# Patient Record
Sex: MALE | Race: WHITE | NOT HISPANIC OR LATINO | URBAN - METROPOLITAN AREA
[De-identification: names, ages, dates, MRNs, and addresses within clinical notes are randomized per-mention and may not be internally consistent; named-entity substitution may affect disease eponyms.]

---

## 2024-01-14 ENCOUNTER — HOSPITAL ENCOUNTER (INPATIENT)
Facility: HOSPITAL | Age: 84
LOS: 2 days | Discharge: NURSING, REHAB OR LONG-TERM CARE | DRG: 291 | End: 2024-01-18
Attending: STUDENT IN AN ORGANIZED HEALTH CARE EDUCATION/TRAINING PROGRAM | Admitting: HOSPITALIST
Payer: MEDICARE

## 2024-01-14 DIAGNOSIS — J10.1 INFLUENZA A: Primary | ICD-10-CM

## 2024-01-14 DIAGNOSIS — J18.9 PNEUMONIA OF RIGHT UPPER LOBE DUE TO INFECTIOUS ORGANISM: ICD-10-CM

## 2024-01-14 DIAGNOSIS — R07.9 CHEST PAIN: ICD-10-CM

## 2024-01-14 DIAGNOSIS — R06.02 SHORTNESS OF BREATH: ICD-10-CM

## 2024-01-14 DIAGNOSIS — J90 PLEURAL EFFUSION: ICD-10-CM

## 2024-01-14 DIAGNOSIS — R79.89 TROPONIN LEVEL ELEVATED: ICD-10-CM

## 2024-01-14 DIAGNOSIS — I50.9 ACUTE CONGESTIVE HEART FAILURE, UNSPECIFIED HEART FAILURE TYPE: ICD-10-CM

## 2024-01-14 PROBLEM — Z79.01 ANTICOAGULANT LONG-TERM USE: Status: ACTIVE | Noted: 2024-01-14

## 2024-01-14 PROBLEM — Z79.4 TYPE 2 DIABETES MELLITUS WITH HYPOGLYCEMIA, WITH LONG-TERM CURRENT USE OF INSULIN: Status: ACTIVE | Noted: 2024-01-14

## 2024-01-14 PROBLEM — I50.23 ACUTE ON CHRONIC SYSTOLIC HEART FAILURE: Status: ACTIVE | Noted: 2024-01-14

## 2024-01-14 PROBLEM — E11.649 TYPE 2 DIABETES MELLITUS WITH HYPOGLYCEMIA, WITH LONG-TERM CURRENT USE OF INSULIN: Status: ACTIVE | Noted: 2024-01-14

## 2024-01-14 PROBLEM — I48.91 ATRIAL FIBRILLATION: Status: ACTIVE | Noted: 2024-01-14

## 2024-01-14 PROBLEM — C34.90 NON-SMALL CELL LUNG CANCER: Status: ACTIVE | Noted: 2024-01-14

## 2024-01-14 LAB
ALBUMIN SERPL BCP-MCNC: 2.1 G/DL (ref 3.5–5.2)
ALP SERPL-CCNC: 72 U/L (ref 55–135)
ALT SERPL W/O P-5'-P-CCNC: 14 U/L (ref 10–44)
ANION GAP SERPL CALC-SCNC: 5 MMOL/L (ref 8–16)
AST SERPL-CCNC: 20 U/L (ref 10–40)
BASOPHILS # BLD AUTO: 0.03 K/UL (ref 0–0.2)
BASOPHILS NFR BLD: 0.6 % (ref 0–1.9)
BILIRUB SERPL-MCNC: 0.6 MG/DL (ref 0.1–1)
BNP SERPL-MCNC: 645 PG/ML (ref 0–99)
BUN SERPL-MCNC: 16 MG/DL (ref 8–23)
CALCIUM SERPL-MCNC: 8.5 MG/DL (ref 8.7–10.5)
CHLORIDE SERPL-SCNC: 108 MMOL/L (ref 95–110)
CO2 SERPL-SCNC: 30 MMOL/L (ref 23–29)
CREAT SERPL-MCNC: 1 MG/DL (ref 0.5–1.4)
D DIMER PPP IA.FEU-MCNC: 2.34 MG/L FEU
DIFFERENTIAL METHOD BLD: ABNORMAL
EOSINOPHIL # BLD AUTO: 0 K/UL (ref 0–0.5)
EOSINOPHIL NFR BLD: 0.8 % (ref 0–8)
ERYTHROCYTE [DISTWIDTH] IN BLOOD BY AUTOMATED COUNT: 16 % (ref 11.5–14.5)
EST. GFR  (NO RACE VARIABLE): >60 ML/MIN/1.73 M^2
GLUCOSE SERPL-MCNC: 74 MG/DL (ref 70–110)
HCT VFR BLD AUTO: 28.4 % (ref 40–54)
HGB BLD-MCNC: 8.5 G/DL (ref 14–18)
IMM GRANULOCYTES # BLD AUTO: 0.03 K/UL (ref 0–0.04)
IMM GRANULOCYTES NFR BLD AUTO: 0.6 % (ref 0–0.5)
INFLUENZA A, MOLECULAR: POSITIVE
INFLUENZA B, MOLECULAR: NEGATIVE
INR PPP: 1.7 (ref 0.8–1.2)
LYMPHOCYTES # BLD AUTO: 0.6 K/UL (ref 1–4.8)
LYMPHOCYTES NFR BLD: 11.1 % (ref 18–48)
MCH RBC QN AUTO: 26 PG (ref 27–31)
MCHC RBC AUTO-ENTMCNC: 29.9 G/DL (ref 32–36)
MCV RBC AUTO: 87 FL (ref 82–98)
MONOCYTES # BLD AUTO: 0.6 K/UL (ref 0.3–1)
MONOCYTES NFR BLD: 10.9 % (ref 4–15)
NEUTROPHILS # BLD AUTO: 3.9 K/UL (ref 1.8–7.7)
NEUTROPHILS NFR BLD: 76 % (ref 38–73)
NRBC BLD-RTO: 0 /100 WBC
PLATELET # BLD AUTO: 267 K/UL (ref 150–450)
PMV BLD AUTO: 10.3 FL (ref 9.2–12.9)
POCT GLUCOSE: 114 MG/DL (ref 70–110)
POCT GLUCOSE: 138 MG/DL (ref 70–110)
POCT GLUCOSE: 138 MG/DL (ref 70–110)
POCT GLUCOSE: 154 MG/DL (ref 70–110)
POCT GLUCOSE: 163 MG/DL (ref 70–110)
POCT GLUCOSE: 203 MG/DL (ref 70–110)
POCT GLUCOSE: 28 MG/DL (ref 70–110)
POCT GLUCOSE: 66 MG/DL (ref 70–110)
POTASSIUM SERPL-SCNC: 3.5 MMOL/L (ref 3.5–5.1)
PROT SERPL-MCNC: 6.5 G/DL (ref 6–8.4)
PROTHROMBIN TIME: 17.5 SEC (ref 9–12.5)
RBC # BLD AUTO: 3.27 M/UL (ref 4.6–6.2)
SARS-COV-2 RDRP RESP QL NAA+PROBE: NEGATIVE
SODIUM SERPL-SCNC: 143 MMOL/L (ref 136–145)
SPECIMEN SOURCE: ABNORMAL
TROPONIN I SERPL DL<=0.01 NG/ML-MCNC: 0.01 NG/ML (ref 0–0.03)
TROPONIN I SERPL DL<=0.01 NG/ML-MCNC: 0.03 NG/ML (ref 0–0.03)
WBC # BLD AUTO: 5.06 K/UL (ref 3.9–12.7)

## 2024-01-14 PROCEDURE — 85610 PROTHROMBIN TIME: CPT | Performed by: STUDENT IN AN ORGANIZED HEALTH CARE EDUCATION/TRAINING PROGRAM

## 2024-01-14 PROCEDURE — U0002 COVID-19 LAB TEST NON-CDC: HCPCS | Performed by: STUDENT IN AN ORGANIZED HEALTH CARE EDUCATION/TRAINING PROGRAM

## 2024-01-14 PROCEDURE — 93010 ELECTROCARDIOGRAM REPORT: CPT | Mod: ,,, | Performed by: INTERNAL MEDICINE

## 2024-01-14 PROCEDURE — 25000003 PHARM REV CODE 250: Performed by: INTERNAL MEDICINE

## 2024-01-14 PROCEDURE — 85025 COMPLETE CBC W/AUTO DIFF WBC: CPT | Performed by: STUDENT IN AN ORGANIZED HEALTH CARE EDUCATION/TRAINING PROGRAM

## 2024-01-14 PROCEDURE — 87502 INFLUENZA DNA AMP PROBE: CPT | Performed by: STUDENT IN AN ORGANIZED HEALTH CARE EDUCATION/TRAINING PROGRAM

## 2024-01-14 PROCEDURE — 96375 TX/PRO/DX INJ NEW DRUG ADDON: CPT

## 2024-01-14 PROCEDURE — 87502 INFLUENZA DNA AMP PROBE: CPT

## 2024-01-14 PROCEDURE — G0378 HOSPITAL OBSERVATION PER HR: HCPCS

## 2024-01-14 PROCEDURE — 82962 GLUCOSE BLOOD TEST: CPT

## 2024-01-14 PROCEDURE — 99285 EMERGENCY DEPT VISIT HI MDM: CPT | Mod: 25

## 2024-01-14 PROCEDURE — 63600175 PHARM REV CODE 636 W HCPCS: Performed by: INTERNAL MEDICINE

## 2024-01-14 PROCEDURE — 93005 ELECTROCARDIOGRAM TRACING: CPT

## 2024-01-14 PROCEDURE — 85379 FIBRIN DEGRADATION QUANT: CPT | Performed by: STUDENT IN AN ORGANIZED HEALTH CARE EDUCATION/TRAINING PROGRAM

## 2024-01-14 PROCEDURE — 25500020 PHARM REV CODE 255: Performed by: STUDENT IN AN ORGANIZED HEALTH CARE EDUCATION/TRAINING PROGRAM

## 2024-01-14 PROCEDURE — 84484 ASSAY OF TROPONIN QUANT: CPT | Mod: 91 | Performed by: STUDENT IN AN ORGANIZED HEALTH CARE EDUCATION/TRAINING PROGRAM

## 2024-01-14 PROCEDURE — 5A0945A ASSISTANCE WITH RESPIRATORY VENTILATION, 24-96 CONSECUTIVE HOURS, HIGH NASAL FLOW/VELOCITY: ICD-10-PCS | Performed by: EMERGENCY MEDICINE

## 2024-01-14 PROCEDURE — 83880 ASSAY OF NATRIURETIC PEPTIDE: CPT | Performed by: STUDENT IN AN ORGANIZED HEALTH CARE EDUCATION/TRAINING PROGRAM

## 2024-01-14 PROCEDURE — 80053 COMPREHEN METABOLIC PANEL: CPT | Performed by: STUDENT IN AN ORGANIZED HEALTH CARE EDUCATION/TRAINING PROGRAM

## 2024-01-14 RX ORDER — BUMETANIDE 0.25 MG/ML
1 INJECTION INTRAMUSCULAR; INTRAVENOUS ONCE
Status: COMPLETED | OUTPATIENT
Start: 2024-01-14 | End: 2024-01-14

## 2024-01-14 RX ORDER — WARFARIN 3 MG/1
3 TABLET ORAL DAILY
Status: DISCONTINUED | OUTPATIENT
Start: 2024-01-14 | End: 2024-01-14

## 2024-01-14 RX ORDER — LIDOCAINE 560 MG/1
1 PATCH PERCUTANEOUS; TOPICAL; TRANSDERMAL
COMMUNITY
Start: 2024-01-06

## 2024-01-14 RX ORDER — IBUPROFEN 200 MG
16 TABLET ORAL
Status: DISCONTINUED | OUTPATIENT
Start: 2024-01-14 | End: 2024-01-18 | Stop reason: HOSPADM

## 2024-01-14 RX ORDER — WARFARIN 3 MG/1
3 TABLET ORAL DAILY
COMMUNITY

## 2024-01-14 RX ORDER — INSULIN ASPART 100 [IU]/ML
0-5 INJECTION, SOLUTION INTRAVENOUS; SUBCUTANEOUS
Status: DISCONTINUED | OUTPATIENT
Start: 2024-01-14 | End: 2024-01-16

## 2024-01-14 RX ORDER — GABAPENTIN 300 MG/1
300 CAPSULE ORAL NIGHTLY
COMMUNITY
Start: 2023-12-22

## 2024-01-14 RX ORDER — ONDANSETRON 4 MG/1
4 TABLET, ORALLY DISINTEGRATING ORAL EVERY 8 HOURS PRN
COMMUNITY
Start: 2024-01-05

## 2024-01-14 RX ORDER — ASCORBIC ACID 500 MG
500 TABLET ORAL 2 TIMES DAILY
COMMUNITY

## 2024-01-14 RX ORDER — ZINC SULFATE 50(220)MG
1 CAPSULE ORAL EVERY MORNING
COMMUNITY

## 2024-01-14 RX ORDER — SIMETHICONE 80 MG
1 TABLET,CHEWABLE ORAL 4 TIMES DAILY PRN
Status: DISCONTINUED | OUTPATIENT
Start: 2024-01-14 | End: 2024-01-18 | Stop reason: HOSPADM

## 2024-01-14 RX ORDER — GLUCAGON 1 MG
1 KIT INJECTION
Status: DISCONTINUED | OUTPATIENT
Start: 2024-01-14 | End: 2024-01-18 | Stop reason: HOSPADM

## 2024-01-14 RX ORDER — ALUMINUM HYDROXIDE, MAGNESIUM HYDROXIDE, AND SIMETHICONE 1200; 120; 1200 MG/30ML; MG/30ML; MG/30ML
30 SUSPENSION ORAL 4 TIMES DAILY PRN
Status: DISCONTINUED | OUTPATIENT
Start: 2024-01-14 | End: 2024-01-18 | Stop reason: HOSPADM

## 2024-01-14 RX ORDER — DEXTROSE MONOHYDRATE 100 MG/ML
INJECTION, SOLUTION INTRAVENOUS CONTINUOUS
Status: DISCONTINUED | OUTPATIENT
Start: 2024-01-14 | End: 2024-01-14

## 2024-01-14 RX ORDER — LEVOTHYROXINE SODIUM 150 UG/1
150 TABLET ORAL
COMMUNITY
Start: 2023-09-13 | End: 2024-03-11

## 2024-01-14 RX ORDER — LEVOTHYROXINE SODIUM 150 UG/1
150 TABLET ORAL
Status: DISCONTINUED | OUTPATIENT
Start: 2024-01-15 | End: 2024-01-18 | Stop reason: HOSPADM

## 2024-01-14 RX ORDER — METFORMIN HYDROCHLORIDE 500 MG/1
1000 TABLET ORAL 2 TIMES DAILY WITH MEALS
COMMUNITY

## 2024-01-14 RX ORDER — IPRATROPIUM BROMIDE AND ALBUTEROL SULFATE 2.5; .5 MG/3ML; MG/3ML
3 SOLUTION RESPIRATORY (INHALATION) EVERY 4 HOURS PRN
Status: DISCONTINUED | OUTPATIENT
Start: 2024-01-14 | End: 2024-01-18 | Stop reason: HOSPADM

## 2024-01-14 RX ORDER — AMIODARONE HYDROCHLORIDE 200 MG/1
200 TABLET ORAL DAILY
COMMUNITY

## 2024-01-14 RX ORDER — OSELTAMIVIR PHOSPHATE 75 MG/1
75 CAPSULE ORAL 2 TIMES DAILY
Status: DISCONTINUED | OUTPATIENT
Start: 2024-01-14 | End: 2024-01-16

## 2024-01-14 RX ORDER — MONTELUKAST SODIUM 4 MG/1
1 TABLET, CHEWABLE ORAL
COMMUNITY

## 2024-01-14 RX ORDER — ONDANSETRON HYDROCHLORIDE 2 MG/ML
4 INJECTION, SOLUTION INTRAVENOUS EVERY 8 HOURS PRN
Status: DISCONTINUED | OUTPATIENT
Start: 2024-01-14 | End: 2024-01-18 | Stop reason: HOSPADM

## 2024-01-14 RX ORDER — INSULIN LISPRO 100 [IU]/ML
0-15 INJECTION, SOLUTION INTRAVENOUS; SUBCUTANEOUS
COMMUNITY
Start: 2024-01-05 | End: 2024-07-03

## 2024-01-14 RX ORDER — NAPROXEN SODIUM 220 MG/1
81 TABLET, FILM COATED ORAL ONCE
Status: COMPLETED | OUTPATIENT
Start: 2024-01-14 | End: 2024-01-14

## 2024-01-14 RX ORDER — IBUPROFEN 200 MG
24 TABLET ORAL
Status: DISCONTINUED | OUTPATIENT
Start: 2024-01-14 | End: 2024-01-18 | Stop reason: HOSPADM

## 2024-01-14 RX ORDER — BUMETANIDE 0.5 MG/1
0.5 TABLET ORAL
Status: ON HOLD | COMMUNITY
End: 2024-01-17

## 2024-01-14 RX ORDER — NALOXONE HCL 0.4 MG/ML
0.4 VIAL (ML) INJECTION
Status: DISCONTINUED | OUTPATIENT
Start: 2024-01-14 | End: 2024-01-18 | Stop reason: HOSPADM

## 2024-01-14 RX ORDER — SODIUM CHLORIDE 0.9 % (FLUSH) 0.9 %
10 SYRINGE (ML) INJECTION
Status: DISCONTINUED | OUTPATIENT
Start: 2024-01-14 | End: 2024-01-18 | Stop reason: HOSPADM

## 2024-01-14 RX ORDER — ACETAMINOPHEN 325 MG/1
650 TABLET ORAL EVERY 4 HOURS PRN
Status: DISCONTINUED | OUTPATIENT
Start: 2024-01-14 | End: 2024-01-18 | Stop reason: HOSPADM

## 2024-01-14 RX ORDER — GUAIFENESIN 100 MG/5ML
200 SOLUTION ORAL EVERY 4 HOURS PRN
Status: DISCONTINUED | OUTPATIENT
Start: 2024-01-14 | End: 2024-01-18 | Stop reason: HOSPADM

## 2024-01-14 RX ORDER — METFORMIN HYDROCHLORIDE 500 MG/1
1000 TABLET ORAL
COMMUNITY
End: 2024-01-14

## 2024-01-14 RX ORDER — WARFARIN SODIUM 5 MG/1
5 TABLET ORAL DAILY
Status: COMPLETED | OUTPATIENT
Start: 2024-01-14 | End: 2024-01-14

## 2024-01-14 RX ORDER — DOXYCYCLINE HYCLATE 100 MG
100 TABLET ORAL EVERY 12 HOURS
Status: DISCONTINUED | OUTPATIENT
Start: 2024-01-14 | End: 2024-01-18 | Stop reason: HOSPADM

## 2024-01-14 RX ORDER — AMIODARONE HYDROCHLORIDE 200 MG/1
200 TABLET ORAL DAILY
Status: DISCONTINUED | OUTPATIENT
Start: 2024-01-14 | End: 2024-01-18 | Stop reason: HOSPADM

## 2024-01-14 RX ADMIN — AMIODARONE HYDROCHLORIDE 200 MG: 200 TABLET ORAL at 10:01

## 2024-01-14 RX ADMIN — DOXYCYCLINE HYCLATE 100 MG: 100 TABLET, COATED ORAL at 08:01

## 2024-01-14 RX ADMIN — OSELTAMIVIR PHOSPHATE 75 MG: 75 CAPSULE ORAL at 08:01

## 2024-01-14 RX ADMIN — OSELTAMIVIR PHOSPHATE 75 MG: 75 CAPSULE ORAL at 10:01

## 2024-01-14 RX ADMIN — BUMETANIDE 1 MG: 0.25 INJECTION INTRAMUSCULAR; INTRAVENOUS at 07:01

## 2024-01-14 RX ADMIN — IOHEXOL 100 ML: 350 INJECTION, SOLUTION INTRAVENOUS at 06:01

## 2024-01-14 RX ADMIN — WARFARIN SODIUM 5 MG: 5 TABLET ORAL at 06:01

## 2024-01-14 RX ADMIN — DEXTROSE MONOHYDRATE 250 ML: 100 INJECTION, SOLUTION INTRAVENOUS at 10:01

## 2024-01-14 RX ADMIN — ASPIRIN 81 MG CHEWABLE TABLET 81 MG: 81 TABLET CHEWABLE at 10:01

## 2024-01-14 RX ADMIN — GUAIFENESIN 200 MG: 200 SOLUTION ORAL at 03:01

## 2024-01-14 RX ADMIN — DOXYCYCLINE HYCLATE 100 MG: 100 TABLET, COATED ORAL at 10:01

## 2024-01-14 NOTE — SUBJECTIVE & OBJECTIVE
Past Medical History:   Diagnosis Date    Acute kidney failure, unspecified     Chronic systolic heart failure     Current use of long term anticoagulation     Longstanding persistent atrial fibrillation     Metabolic encephalopathy     Mixed hyperlipidemia     Presence of aortocoronary bypass graft     Presence of cardiac pacemaker     Primary hypertension     Stage 3b chronic kidney disease (CKD)     Type 2 diabetes mellitus without complications        Past Surgical History:   Procedure Laterality Date    ATRIAL CARDIAC PACEMAKER INSERTION      CARDIAC SURGERY      COLONOSCOPY      KNEE SURGERY         Review of patient's allergies indicates:   Allergen Reactions    Morphine Shortness Of Breath    Amoxicillin Nausea And Vomiting       No current facility-administered medications on file prior to encounter.     Current Outpatient Medications on File Prior to Encounter   Medication Sig    gabapentin (NEURONTIN) 300 MG capsule Take 300 mg by mouth.    insulin lispro 100 unit/mL pen Inject 0-15 Units into the skin.    levothyroxine (SYNTHROID) 150 MCG tablet Take 150 mcg by mouth.    LIDOcaine 4 % PtMd Apply 1 patch topically.    amiodarone (PACERONE) 200 MG Tab Take 200 mg by mouth.    aspirin 81 mg Cap Take 81 mg by mouth.    bumetanide (BUMEX) 0.5 MG Tab Take 0.5 mg by mouth.    colestipoL (COLESTID) 1 gram Tab Take 1 g by mouth.    insulin glargine,hum.rec.anlog (LANTUS SOLOSTAR U-100 INSULIN SUBQ) Inject 35 Units into the skin every evening.    metFORMIN (GLUCOPHAGE) 500 MG tablet Take 1,000 mg by mouth.    warfarin (COUMADIN) 3 MG tablet Take 3 mg by mouth.     Family History       Problem Relation (Age of Onset)    Cancer Mother, Father          Tobacco Use    Smoking status: Former     Types: Cigarettes    Smokeless tobacco: Never   Substance and Sexual Activity    Alcohol use: Not Currently    Drug use: Not on file    Sexual activity: Not on file     Review of Systems   Respiratory:  Positive for shortness  of breath.    Cardiovascular:  Positive for leg swelling.   All other systems reviewed and are negative.    Objective:     Vital Signs (Most Recent):  Temp: 97.8 °F (36.6 °C) (01/14/24 0707)  Pulse: 70 (01/14/24 0930)  Resp: (!) 21 (01/14/24 0930)  BP: (!) 151/67 (01/14/24 0930)  SpO2: 98 % (01/14/24 0930) Vital Signs (24h Range):  Temp:  [97.8 °F (36.6 °C)-98.5 °F (36.9 °C)] 97.8 °F (36.6 °C)  Pulse:  [68-74] 70  Resp:  [18-21] 21  SpO2:  [97 %-100 %] 98 %  BP: (139-164)/(65-80) 151/67     Weight: 83.5 kg (184 lb 1.4 oz)  Body mass index is 24.97 kg/m².     Physical Exam  HENT:      Head: Normocephalic and atraumatic.   Cardiovascular:      Rate and Rhythm: Normal rate and regular rhythm.      Comments: paced  Pulmonary:      Effort: Pulmonary effort is normal. No respiratory distress.      Comments: Diminished at bases, scattered wheeze  Abdominal:      General: Bowel sounds are normal. There is no distension.      Palpations: Abdomen is soft.      Tenderness: There is no abdominal tenderness.   Musculoskeletal:      Comments: Bilateral pedal edema   Skin:     General: Skin is warm and dry.   Neurological:      Mental Status: He is alert and oriented to person, place, and time. Mental status is at baseline.                Significant Labs: All pertinent labs within the past 24 hours have been reviewed.  CBC:   Recent Labs   Lab 01/14/24 0439   WBC 5.06   HGB 8.5*   HCT 28.4*        CMP:   Recent Labs   Lab 01/14/24 0439      K 3.5      CO2 30*   GLU 74   BUN 16   CREATININE 1.0   CALCIUM 8.5*   PROT 6.5   ALBUMIN 2.1*   BILITOT 0.6   ALKPHOS 72   AST 20   ALT 14   ANIONGAP 5*     Cardiac Markers:   Recent Labs   Lab 01/14/24 0439   *     Coagulation:   Recent Labs   Lab 01/14/24 0439   INR 1.7*     POCT Glucose:   Recent Labs   Lab 01/14/24  1000 01/14/24  1004   POCTGLUCOSE 28* 66*     Troponin:   Recent Labs   Lab 01/14/24  0439 01/14/24  0704   TROPONINI 0.031* 0.008        Significant Imaging: I have reviewed all pertinent imaging results/findings within the past 24 hours.

## 2024-01-14 NOTE — ASSESSMENT & PLAN NOTE
-tamiflu  -doxycycline as recent pneumonia and unable to compare previous images to determine if infiltrates worsening  -supportive care  -supplemental oxygen

## 2024-01-14 NOTE — ASSESSMENT & PLAN NOTE
Patient's FSGs are uncontrolled due to hypoglycemia on current medication regimen.  Last A1c reviewed-   Lab Results   Component Value Date    HGBA1C 6.1 10/13/2022     Most recent fingerstick glucose reviewed-   Recent Labs   Lab 01/14/24  1000 01/14/24  1004   POCTGLUCOSE 28* 66*     Current correctional scale  Low  Decrease anti-hyperglycemic dose as follows-   Antihyperglycemics (From admission, onward)      Start     Stop Route Frequency Ordered    01/14/24 1058  insulin aspart U-100 pen 0-5 Units         -- SubQ Before meals & nightly PRN 01/14/24 0959          Hold Oral hypoglycemics while patient is in the hospital.  Hold basal insulin for now as hypoglycemic  Hypoglycemia protocol

## 2024-01-14 NOTE — ASSESSMENT & PLAN NOTE
This patient has long term use on an anticoagulant with Select Anticoagulant(s): Warfarin/Coumadin. Their long term anticoagulation will be Held or Continued: continued. They are on long term anticoagulation due to Reason for Anticoagulation: Atrial fibrillation.   Pharmacy consulted to manage dosing of coumadin

## 2024-01-14 NOTE — ASSESSMENT & PLAN NOTE
Patient is identified as having Systolic (HFrEF) heart failure that is Acute on chronic. CHF is currently uncontrolled due to Pulmonary edema/pleural effusion on CXR. Latest ECHO performed and demonstrates- Patient Name: BILLY GAYTAN W   Patient ID: [id number]  Account #:   : 1940 (83y 2m)  Gender: M   Study Date: 2023 07:38:50 AM   Ht(Cm): 183  Wt(Kg): 77.57  BSA: 1.99   Accession #: [id number]   Sonographer: [email or web address]  Location: [facility] Provider: NINI ZALDIVAR     Heart Rate: 71   Quality: The study images were of technically adequate quality.   Ref.Provider: NINI ZALDIVAR     -----------------------     CONCLUSIONS:   1. Dilated left ventricle with severe global hypokinesis and severely depressed left   ventricular systolic function with an EF<20%.   2. Dilated right atrium and ventricle; ICD lead noted in RA/RV.   3. Moderate mitral valve regurgitation.   4. Mild tricuspid valve regurgitation.   . Continue Beta Blocker and monitor clinical status closely. Monitor on telemetry. Patient is on CHF pathway.  Monitor strict Is&Os and daily weights.  Place on fluid restriction of 1.5 L. Continue to stress to patient importance of self efficacy and  on diet for CHF. Last BNP reviewed- and noted below   Recent Labs   Lab 24  0439   *   .  -patient 6 kg up  -IV bumex bid for diuresis  -monitor renal function closely will diuresing  -supplemental oxygen

## 2024-01-14 NOTE — ASSESSMENT & PLAN NOTE
Patient with Long standing persistent (>12 months) atrial fibrillation which is controlled currently with Beta Blocker and Amiodarone. Patient is currently paced.[id number] Score: 2. HASBLED Score: . Anticoagulation indicated. Anticoagulation done with warfarin .

## 2024-01-14 NOTE — ED PROVIDER NOTES
SCRIBE #1 NOTE: I, Catherine Huitron, am scribing for, and in the presence of, Vijay Mcintyre MD. I have scribed the HPI, ROS, and PEx.     SCRIBE #2 NOTE: I, Jadiel Keys Jr., am scribing for, and in the presence of,  Jethro Art MD. I have scribed the remaining portions of the note not scribed by Scribe #1.      History     Chief Complaint   Patient presents with    Shortness of Breath     Sent from [facility] for SOB, nurse reports pt became SOB and sats 90% RA. Pt placed on 2L NC. Pt denies any further SOB    Hypoglycemia     Nurse also reported pts BG 58, was given 3 packs of oral glucose and recheck was 54, EMS gave another 3 packs and now BG 63     Review of patient's allergies indicates:   Allergen Reactions    Morphine Shortness Of Breath    Amoxicillin Nausea And Vomiting         History of Present Illness     HPI    1/14/2024, 4:34 AM  History obtained from the patient and daughter      History of Present Illness: Chavez Kohler is a 83 y.o. male patient with a PMHx of type 2 diabetes mellitus, lung cancer previously on palliative chemotherapy (currently on hold), and coronary artery disease with ischemic cardiomyopathy EF 20%, who presents to the Emergency Department for evaluation of SOB and hypoglycemia which onset earlier this morning. Pt is currently living at [facility], and was sent to the ED from there. Pt's nurse at [place] reports he was placed on 2L NC, and now denies any SOB. Pt's nurse also reports his BG was 58. He was given 3 packs of oral glucose. After rechecking, his BG was 54. EMS gave pt another 3 packs of oral glucose en route. Symptoms are constant and moderate in severity. No mitigating or exacerbating factors reported. No associated sxs. Patient denies any CP, HA, fever, and all other sxs at this time. Prior Tx includes 6 packs of oral glucose. No further complaints or concerns at this time.       Arrival mode:  EMS     PCP: No,  Primary Doctor        Past Medical History:  Past Medical History:   Diagnosis Date    Acute kidney failure, unspecified     Chronic systolic heart failure     Current use of long term anticoagulation     Longstanding persistent atrial fibrillation     Metabolic encephalopathy     Mixed hyperlipidemia     Presence of aortocoronary bypass graft     Presence of cardiac pacemaker     Primary hypertension     Stage 3b chronic kidney disease (CKD)     Type 2 diabetes mellitus without complications        Past Surgical History:  History reviewed. No pertinent surgical history.      Family History:  History reviewed. No pertinent family history.    Social History:  Social History     Tobacco Use    Smoking status: Not on file    Smokeless tobacco: Not on file   Substance and Sexual Activity    Alcohol use: Not on file    Drug use: Not on file    Sexual activity: Not on file        Review of Systems     Review of Systems   Constitutional:  Negative for chills and fever.        (+) hypoglycemia   HENT:  Negative for congestion and sore throat.    Respiratory:  Positive for shortness of breath.    Cardiovascular:  Negative for chest pain.   Gastrointestinal:  Negative for abdominal pain, nausea and vomiting.   Genitourinary:  Negative for dysuria.   Musculoskeletal:  Negative for back pain.   Skin:  Negative for rash.   Neurological:  Negative for weakness and headaches.   Hematological:  Does not bruise/bleed easily.   All other systems reviewed and are negative.       Physical Exam     Initial Vitals [01/14/24 0354]   BP Pulse Resp Temp SpO2   (!) 163/77 68 18 98.5 °F (36.9 °C) 97 %      MAP       --          Physical Exam  Nursing Notes and Vital Signs Reviewed.  Constitutional: Patient is in no acute distress. Well-developed and well-nourished.  Head: Atraumatic. Normocephalic.  Eyes: PERRL. EOM intact. Conjunctivae are not pale. No scleral icterus.  ENT: Mucous membranes are moist. Oropharynx is clear and symmetric.     Neck: Supple. Full ROM. No lymphadenopathy.  Cardiovascular: Regular rate. Irregular rhythm. No murmurs, rubs, or gallops. Distal pulses are 2+ and symmetric.  Pulmonary/Chest: No respiratory distress. Faint bilateral basal crackles.  Abdominal: Soft and non-distended.  There is no tenderness.  No rebound, guarding, or rigidity. Good bowel sounds.  Genitourinary: No CVA tenderness  Musculoskeletal: Moves all extremities. No obvious deformities. 1+ edema to BLE. No calf tenderness.  Skin: Warm and dry.  Neurological:  Alert, awake, and appropriate.  Normal speech.  No acute focal neurological deficits are appreciated.  Psychiatric: Normal affect. Good eye contact. Appropriate in content.     ED Course   Procedures  ED Vital Signs:  Vitals:    01/14/24 0354 01/14/24 0437 01/14/24 0440 01/14/24 0457   BP: (!) 163/77      Pulse: 68  68    Resp: 18      Temp: 98.5 °F (36.9 °C)      TempSrc: Oral      SpO2: 97%      Weight:  83.5 kg (184 lb 1.4 oz)     Height:    6' (1.829 m)    01/14/24 0500 01/14/24 0700 01/14/24 0707 01/14/24 0715   BP: (!) 164/80 (!) 142/65     Pulse: 70 69  70   Resp: 18 19  20   Temp:   97.8 °F (36.6 °C)    TempSrc:   Oral    SpO2: 100% 100%  98%   Weight:       Height:        01/14/24 0731   BP: 139/65   Pulse: 69   Resp: 20   Temp:    TempSrc:    SpO2: 97%   Weight:    Height:        Abnormal Lab Results:  Labs Reviewed   INFLUENZA A & B BY MOLECULAR - Abnormal; Notable for the following components:       Result Value    Influenza A, Molecular Positive (*)     All other components within normal limits   CBC W/ AUTO DIFFERENTIAL - Abnormal; Notable for the following components:    RBC 3.27 (*)     Hemoglobin 8.5 (*)     Hematocrit 28.4 (*)     MCH 26.0 (*)     MCHC 29.9 (*)     RDW 16.0 (*)     Immature Granulocytes 0.6 (*)     Lymph # 0.6 (*)     Gran % 76.0 (*)     Lymph % 11.1 (*)     All other components within normal limits   COMPREHENSIVE METABOLIC PANEL - Abnormal; Notable for the  following components:    CO2 30 (*)     Calcium 8.5 (*)     Albumin 2.1 (*)     Anion Gap 5 (*)     All other components within normal limits   TROPONIN I - Abnormal; Notable for the following components:    Troponin I 0.031 (*)     All other components within normal limits   B-TYPE NATRIURETIC PEPTIDE - Abnormal; Notable for the following components:     (*)     All other components within normal limits   D DIMER, QUANTITATIVE - Abnormal; Notable for the following components:    D-Dimer 2.34 (*)     All other components within normal limits   SARS-COV-2 RNA AMPLIFICATION, QUAL   TROPONIN I        All Lab Results:  Results for orders placed or performed during the hospital encounter of 01/14/24   Influenza A & B by Molecular    Specimen: Nasopharyngeal Swab   Result Value Ref Range    Influenza A, Molecular Positive (A) Negative    Influenza B, Molecular Negative Negative    Flu A & B Source Nasal swab    CBC Auto Differential   Result Value Ref Range    WBC 5.06 3.90 - 12.70 K/uL    RBC 3.27 (L) 4.60 - 6.20 M/uL    Hemoglobin 8.5 (L) 14.0 - 18.0 g/dL    Hematocrit 28.4 (L) 40.0 - 54.0 %    MCV 87 82 - 98 fL    MCH 26.0 (L) 27.0 - 31.0 pg    MCHC 29.9 (L) 32.0 - 36.0 g/dL    RDW 16.0 (H) 11.5 - 14.5 %    Platelets 267 150 - 450 K/uL    MPV 10.3 9.2 - 12.9 fL    Immature Granulocytes 0.6 (H) 0.0 - 0.5 %    Gran # (ANC) 3.9 1.8 - 7.7 K/uL    Immature Grans (Abs) 0.03 0.00 - 0.04 K/uL    Lymph # 0.6 (L) 1.0 - 4.8 K/uL    Mono # 0.6 0.3 - 1.0 K/uL    Eos # 0.0 0.0 - 0.5 K/uL    Baso # 0.03 0.00 - 0.20 K/uL    nRBC 0 0 /100 WBC    Gran % 76.0 (H) 38.0 - 73.0 %    Lymph % 11.1 (L) 18.0 - 48.0 %    Mono % 10.9 4.0 - 15.0 %    Eosinophil % 0.8 0.0 - 8.0 %    Basophil % 0.6 0.0 - 1.9 %    Differential Method Automated    Comprehensive Metabolic Panel   Result Value Ref Range    Sodium 143 136 - 145 mmol/L    Potassium 3.5 3.5 - 5.1 mmol/L    Chloride 108 95 - 110 mmol/L    CO2 30 (H) 23 - 29 mmol/L    Glucose 74 70 -  110 mg/dL    BUN 16 8 - 23 mg/dL    Creatinine 1.0 0.5 - 1.4 mg/dL    Calcium 8.5 (L) 8.7 - 10.5 mg/dL    Total Protein 6.5 6.0 - 8.4 g/dL    Albumin 2.1 (L) 3.5 - 5.2 g/dL    Total Bilirubin 0.6 0.1 - 1.0 mg/dL    Alkaline Phosphatase 72 55 - 135 U/L    AST 20 10 - 40 U/L    ALT 14 10 - 44 U/L    eGFR >60 >60 mL/min/1.73 m^2    Anion Gap 5 (L) 8 - 16 mmol/L   COVID-19 Rapid Screening   Result Value Ref Range    SARS-CoV-2 RNA, Amplification, Qual Negative Negative   Troponin I   Result Value Ref Range    Troponin I 0.031 (H) 0.000 - 0.026 ng/mL   Brain Natriuretic Peptide   Result Value Ref Range     (H) 0 - 99 pg/mL   D-Dimer, Quantitative   Result Value Ref Range    D-Dimer 2.34 (H) <0.50 mg/L FEU        Imaging Results:  Imaging Results              CTA Chest Non-Coronary (PE Studies) (Final result)  Result time 01/14/24 06:53:52      Final result by Asif Tyson MD (01/14/24 06:53:52)                   Impression:      1.  No pulmonary embolism.  2.  The findings are most consistent with acute congestive heart failure with severe cardiomegaly, bilateral moderate pleural effusions, and interstitial and alveolar pulmonary edema.    All CT scans at [this location] are performed using dose modulation techniques as appropriate to a performed exam including the following:  Automated exposure control; adjustment of the mA and/or kV according to patient size (this includes techniques or standardized protocols for targeted exams where dose is matched to indication / reason for exam; i.e. extremities or head); use of iterative reconstruction technique.    Finalized on: 1/14/2024 6:53 AM By:  Asif Tyson MD  BRRG# [id number]      2024-01-14 06:56:03.974    [facility]               Narrative:    EXAM: CTA CHEST NON CORONARY (PE STUDIES)    CLINICAL HISTORY: Shortness of breath.  Chest pain.  Positive d-dimer.    TECHNIQUE: The chest was scanned during the intravenous administration of 100 mL of Omnipaque 350 utilizing a  pulmonary angiogram protocol.  Axial and 3-D MIP images are reviewed.    FINDINGS:  There are no filling defects in the pulmonary arteries to indicate a pulmonary embolism.  There is marked cardiomegaly.  There are surgical changes from prior CABG.  There is a left-sided triple lead AICD.  No pericardial effusion.    Moderate bilateral pleural effusions.  No pneumothorax.  Marked interstitial pulmonary edema.  Consolidation in the right hilar region and right upper lobe.  Patchy consolidation in the lung bases.    The thoracic osseous structures are unremarkable.                                         X-Ray Chest 1 View (Final result)  Result time 01/14/24 07:06:55      Final result by Royce Villanueva MD (01/14/24 07:06:55)                   Impression:      See above.      Electronically signed by: Royce Villanueva MD  Date:    01/14/2024  Time:    07:06               Narrative:    EXAMINATION:  XR CHEST 1 VIEW    CLINICAL HISTORY:  Pneumonia., Shortness of breath;    COMPARISON:  None    FINDINGS:  Sternal wires and mediastinal clips noted.  Left AICD is present.    There is consolidation in the right upper lobe consistent with pneumonia.  There are increased interstitial markings in the right lung base    There are bilateral pleural effusions with atelectasis left lower lobe.                                       The EKG was ordered, reviewed, and independently interpreted by the ED provider.  Interpretation time: 04:48  Rate: 68 BPM  Rhythm: sinus rhythm with 1st degree A-V block with occasional premature ventricular complexes  Interpretation: Right bundle branch block. Left posterior fascicular block. Bifascicular block. V2 and V3 0.5mm ST depression without reciprocal change. Inferior infarct, age undetermined. Abnormal ECG. No STEMI.             The Emergency Provider reviewed the vital signs and test results, which are outlined above.     ED Discussion       6:16 AM: Dr. Micntyre transfers care of  patient to Dr. Art.    7:26 AM: Discussed case with Dr Sullivan ([facility]). Dr. Sullivan  agrees with current care and management of pt and accepts admission.   Admitting Service:   Admitting Physician: Dr. Sullivan   Admit to: OBS    7:26 AM: Re-evaluated pt. I have discussed test results, shared treatment plan, and the need for admission with patient and family at bedside. Pt and family express understanding at this time and agree with all information. All questions answered. Pt and family have no further questions or concerns at this time. Pt is ready for admit.     ED Course as of 01/14/24 0732   Sun Jan 14, 2024   0415 DC summary 1/5/24 [facility]OL:  Reason for Hospitalization  DKA    * Diabetic ketoacidosis without coma associated with type 2 diabetes mellitus (HCC)  83-year-old male with past medical history of lung cancer previously on palliative chemotherapy (currently on hold), diabetes, coronary artery disease with ischemic cardiomyopathy EF 20% who presented to the emergency department on 12/31 with complaints of nausea and vomiting. He was discharged 1 day prior from 12/26 to 12/29 for severe sepsis due to pneumonia and testing positive for rhinovirus. Patient was discharged on Augmentin and per his daughter he initially did well however, he developed nausea and vomiting on morning of presentation and became more lethargic. Patient's family ultimately brought him to the ED where he was found to be in DKA. He was initially admitted to ICU for insulin drip. His glucose gradually improved and he was transitioned to subcutaneous insulin and transferred to the medical floor. His nausea and vomiting have resolved. Blood glucoses have stabilized. Patient participated with PT/OT with recommendation for postacute care therapy in a skilled nursing facility. Medical management consulted and ultimately patient will discharge to [facility] to continue postacute care therapy.    Longstanding  persistent atrial fibrillation (HCC)  Rate is currently controlled with amiodarone. On warfarin for anticoagulation. He will need continued monitoring of PT and INR on warfarin therapy.    Chronic systolic heart failure (HCC)  Known EF of less than 20%. Moderate sized pleural effusion seen on CT     Pneumonia of right middle lobe due to infectious organism  Symptomatically improving from a pneumonia standpoint and pneumonia remained stable with CT scan of the chest 1/2. Patient remains on cefepime however will discontinue antibiotics upon discharge. [KB]      ED Course User Index  [KB] Vijay Mcintyre MD     Medical Decision Making  Differential diagnosis:  Pneumonia, CHF, COVID, flu, pulmonary edema    Patient was evaluated history physical examination along with extensive chart review.  Patient complains of shortness a breath in his had recent multiple admissions to our [facility] for pneumonia.  Patient presents today complaining of shortness a breath.  His vital signs are unremarkable however his BNP was elevated at 645 in his troponin was elevated 0.031.  These are both from baseline.  Patient was had elevated D-dimer 2.3 with a hemoglobin of 8.5 and a normal white count.  He is flu A positive and COVID negative.  Chest x-ray shows right upper lobe infiltrate.  Chart review reveals he had a similar type picture on his pneumonia [facility] however we can not compare with the pictures.  CTA was obtained confirming no pulmonary embolus.  He was CHF with severe cardiomegaly and bilateral pulmonary edema.  There is a right upper lobe infiltrate as well.    Amount and/or Complexity of Data Reviewed  Independent Historian: caregiver  External Data Reviewed: labs, radiology and notes.  Labs: ordered. Decision-making details documented in ED Course.  Radiology: ordered and independent interpretation performed. Decision-making details documented in ED Course.  Discussion of management or test interpretation with external  provider(s): Discussed the case with Dr. Sullivan with hospital medicine graciously accepts the patient for admission    Risk  OTC drugs.  Prescription drug management.  Decision regarding hospitalization.                ED Medication(s):  Medications   bumetanide injection 1 mg (has no administration in time range)   iohexoL (OMNIPAQUE 350) injection 100 mL (100 mLs Intravenous Given 1/14/24 0639)       New Prescriptions    No medications on file               Scribe Attestation:   Scribe #1: I performed the above scribed service and the documentation accurately describes the services I performed. I attest to the accuracy of the note.     Attending:   Physician Attestation Statement for Scribe #1: I, Vijay Mcintyre MD, personally performed the services described in this documentation, as scribed by Catherine Huitron, in my presence, and it is both accurate and complete.       Scribe Attestation:   Scribe #2: I performed the above scribed service and the documentation accurately describes the services I performed. I attest to the accuracy of the note.    Attending Attestation:           Physician Attestation for Scribe:    Physician Attestation Statement for Scribe #2: I, Jethro Art MD, reviewed documentation, as scribed by Jadiel Keys Jr. in my presence, and it is both accurate and complete. I also acknowledge and confirm the content of the note done by Scribe #1.           Clinical Impression       ICD-10-CM ICD-9-CM   1. Influenza A  J10.1 487.1   2. Shortness of breath  R06.02 786.05   3. Pneumonia of right upper lobe due to infectious organism  J18.9 486   4. Acute congestive heart failure, unspecified heart failure type  I50.9 428.0   5. Troponin level elevated  R79.89 790.6   6. Pleural effusion  J90 511.9       Disposition:   Disposition: Placed in Observation  Condition: Stable        Jethro Art Jr., MD  01/14/24 0782

## 2024-01-14 NOTE — PHARMACY MED REC
"Admission Medication History     The home medication history was taken by Glenda Zapata.    You may go to "Admission" then "Reconcile Home Medications" tabs to review and/or act upon these items.     The home medication list has been updated by the Pharmacy department.   Please read ALL comments highlighted in yellow.   Please address this information as you see fit.    Feel free to contact us if you have any questions or require assistance.      The medications listed below were removed from the home medication list. Please reorder if appropriate:  Patient reports no longer taking the following medication(s):          Medications listed below were obtained from: Patient/family, [email or web address], and Nursing home,[facility]  (Not in a hospital admission)      LAST MED REC COMPLETED:         Glenda Zapata  [id number]      Current Outpatient Medications on File Prior to Encounter   Medication Sig Dispense Refill Last Dose    amiodarone (PACERONE) 200 MG Tab Take 200 mg by mouth once daily.   1/14/2024    ascorbic acid, vitamin C, (VITAMIN C) 500 MG tablet Take 500 mg by mouth 2 (two) times daily.   1/13/2024    aspirin 81 mg Cap Take 81 mg by mouth once daily.   1/14/2024    bumetanide (BUMEX) 0.5 MG Tab Take 0.5 mg by mouth.   1/13/2024    colestipoL (COLESTID) 1 gram Tab Take 1 g by mouth.   1/13/2024    gabapentin (NEURONTIN) 300 MG capsule Take 300 mg by mouth every evening.   1/13/24    insulin glargine,hum.rec.anlog (LANTUS SOLOSTAR U-100 INSULIN SUBQ) Inject 35 Units into the skin every evening.   1/13/2024    insulin lispro 100 unit/mL pen Inject 0-15 Units into the skin.   1/12/2024    levothyroxine (SYNTHROID) 150 MCG tablet Take 150 mcg by mouth before breakfast.   1/13/2024    LIDOcaine 4 % PtMd Apply 1 patch topically.   1/13/2024    metFORMIN (GLUCOPHAGE) 500 MG tablet Take 1,000 mg by mouth 2 (two) times daily with meals.   1/13/2024    ondansetron (ZOFRAN-ODT) " 4 MG TbDL Take 4 mg by mouth every 8 (eight) hours as needed.   Past Week    warfarin (COUMADIN) 3 MG tablet Take 3 mg by mouth Daily.       zinc sulfate (ZINCATE) 50 mg zinc (220 mg) capsule Take 1 capsule by mouth every morning.   Unknown                         .

## 2024-01-14 NOTE — H&P
[facility] - Emergency Dept.  [facility]  History & Physical    Patient Name: Chavez Kohler  MRN: [id number]  Patient Class: OP- Observation  Admission Date: 1/14/2024  Attending Physician: Ismael Sullivan MD   Primary Care Provider: Tess Primary Doctor         Patient information was obtained from patient, relative(s), past medical records, and ER records.     Subjective:     Principal Problem:Acute on chronic systolic heart failure    Chief Complaint:   Chief Complaint   Patient presents with    Shortness of Breath     Sent from [facility] for SOB, nurse reports pt became SOB and sats 90% RA. Pt placed on 2L NC. Pt denies any further SOB    Hypoglycemia     Nurse also reported pts BG 58, was given 3 packs of oral glucose and recheck was 54, EMS gave another 3 packs and now BG 63        HPI: The patient is an 82 yo male with past medical history of atrial fibrillation on Coumadin, CHF (EF 20%), diabetes, hypothyroidism, hypertension,lung cancer, CKD and HLD who presented to the ED from [facility] with shortness of breath and hypoglycemia. The patient recently treated for pneumonia and DKA at [facility]. He has gained weight since being at [facility] but family thought related to increase in oral intake. Patient had increasing pedal edema but just revealed to family while in the ED. He visited his wife yesterday at [facility]. He reported shortness of breath and was placed on 2L NC with improvement in his symptoms. He was also noted to be hypoglycemic. His daughter reports hyperglycemia yesterday. Patient responds to questions but goes back to sleep. Workup revealed bilateral pleural effusion and Flu A positive. Hospital medicine consulted.    Past Medical History:   Diagnosis Date    Acute kidney failure, unspecified     Chronic systolic heart failure     Current use of long term anticoagulation     Longstanding persistent atrial fibrillation     Metabolic encephalopathy     Mixed  hyperlipidemia     Presence of aortocoronary bypass graft     Presence of cardiac pacemaker     Primary hypertension     Stage 3b chronic kidney disease (CKD)     Type 2 diabetes mellitus without complications        Past Surgical History:   Procedure Laterality Date    ATRIAL CARDIAC PACEMAKER INSERTION      CARDIAC SURGERY      COLONOSCOPY      KNEE SURGERY         Review of patient's allergies indicates:   Allergen Reactions    Morphine Shortness Of Breath    Amoxicillin Nausea And Vomiting       No current facility-administered medications on file prior to encounter.     Current Outpatient Medications on File Prior to Encounter   Medication Sig    gabapentin (NEURONTIN) 300 MG capsule Take 300 mg by mouth.    insulin lispro 100 unit/mL pen Inject 0-15 Units into the skin.    levothyroxine (SYNTHROID) 150 MCG tablet Take 150 mcg by mouth.    LIDOcaine 4 % PtMd Apply 1 patch topically.    amiodarone (PACERONE) 200 MG Tab Take 200 mg by mouth.    aspirin 81 mg Cap Take 81 mg by mouth.    bumetanide (BUMEX) 0.5 MG Tab Take 0.5 mg by mouth.    colestipoL (COLESTID) 1 gram Tab Take 1 g by mouth.    insulin glargine,hum.rec.anlog (LANTUS SOLOSTAR U-100 INSULIN SUBQ) Inject 35 Units into the skin every evening.    metFORMIN (GLUCOPHAGE) 500 MG tablet Take 1,000 mg by mouth.    warfarin (COUMADIN) 3 MG tablet Take 3 mg by mouth.     Family History       Problem Relation (Age of Onset)    Cancer Mother, Father          Tobacco Use    Smoking status: Former     Types: Cigarettes    Smokeless tobacco: Never   Substance and Sexual Activity    Alcohol use: Not Currently    Drug use: Not on file    Sexual activity: Not on file     Review of Systems   Respiratory:  Positive for shortness of breath.    Cardiovascular:  Positive for leg swelling.   All other systems reviewed and are negative.    Objective:     Vital Signs (Most Recent):  Temp: 97.8 °F (36.6 °C) (01/14/24 0707)  Pulse: 70 (01/14/24 0930)  Resp: (!) 21 (01/14/24  0930)  BP: (!) 151/67 (01/14/24 0930)  SpO2: 98 % (01/14/24 0930) Vital Signs (24h Range):  Temp:  [97.8 °F (36.6 °C)-98.5 °F (36.9 °C)] 97.8 °F (36.6 °C)  Pulse:  [68-74] 70  Resp:  [18-21] 21  SpO2:  [97 %-100 %] 98 %  BP: (139-164)/(65-80) 151/67     Weight: 83.5 kg (184 lb 1.4 oz)  Body mass index is 24.97 kg/m².     Physical Exam  HENT:      Head: Normocephalic and atraumatic.   Cardiovascular:      Rate and Rhythm: Normal rate and regular rhythm.      Comments: paced  Pulmonary:      Effort: Pulmonary effort is normal. No respiratory distress.      Comments: Diminished at bases, scattered wheeze  Abdominal:      General: Bowel sounds are normal. There is no distension.      Palpations: Abdomen is soft.      Tenderness: There is no abdominal tenderness.   Musculoskeletal:      Comments: Bilateral pedal edema   Skin:     General: Skin is warm and dry.   Neurological:      Mental Status: He is alert and oriented to person, place, and time. Mental status is at baseline.                Significant Labs: All pertinent labs within the past 24 hours have been reviewed.  CBC:   Recent Labs   Lab 01/14/24 0439   WBC 5.06   HGB 8.5*   HCT 28.4*        CMP:   Recent Labs   Lab 01/14/24 0439      K 3.5      CO2 30*   GLU 74   BUN 16   CREATININE 1.0   CALCIUM 8.5*   PROT 6.5   ALBUMIN 2.1*   BILITOT 0.6   ALKPHOS 72   AST 20   ALT 14   ANIONGAP 5*     Cardiac Markers:   Recent Labs   Lab 01/14/24  0439   *     Coagulation:   Recent Labs   Lab 01/14/24 0439   INR 1.7*     POCT Glucose:   Recent Labs   Lab 01/14/24  1000 01/14/24  1004   POCTGLUCOSE 28* 66*     Troponin:   Recent Labs   Lab 01/14/24  0439 01/14/24  0704   TROPONINI 0.031* 0.008       Significant Imaging: I have reviewed all pertinent imaging results/findings within the past 24 hours.  Assessment/Plan:     * Acute on chronic systolic heart failure  Patient is identified as having Systolic (HFrEF) heart failure that is Acute on  chronic. CHF is currently uncontrolled due to Pulmonary edema/pleural effusion on CXR. Latest ECHO performed and demonstrates- Patient Name: BILLY GAYTAN W   Patient ID: [id number]  Account #:   : 1940 (83y 2m)  Gender: M   Study Date: 2023 07:38:50 AM   Ht(Cm): 183  Wt(Kg): 77.57  BSA: 1.99   Accession #: [id number]   Sonographer: leonard  Location: [facility] Provider: NINI ZALDIVAR     Heart Rate: 71   Quality: The study images were of technically adequate quality.   Ref.Provider: NINI ZALDIVAR     -----------------------     CONCLUSIONS:   1. Dilated left ventricle with severe global hypokinesis and severely depressed left   ventricular systolic function with an EF<20%.   2. Dilated right atrium and ventricle; ICD lead noted in RA/RV.   3. Moderate mitral valve regurgitation.   4. Mild tricuspid valve regurgitation.   . Continue Beta Blocker and monitor clinical status closely. Monitor on telemetry. Patient is on CHF pathway.  Monitor strict Is&Os and daily weights.  Place on fluid restriction of 1.5 L. Continue to stress to patient importance of self efficacy and  on diet for CHF. Last BNP reviewed- and noted below   Recent Labs   Lab 24  0439   *   .  -patient 6 kg up  -IV bumex bid for diuresis  -monitor renal function closely will diuresing  -supplemental oxygen          Anticoagulant long-term use  This patient has long term use on an anticoagulant with Select Anticoagulant(s): Warfarin/Coumadin. Their long term anticoagulation will be Held or Continued: continued. They are on long term anticoagulation due to Reason for Anticoagulation: Atrial fibrillation.   Pharmacy consulted to manage dosing of coumadin    Type 2 diabetes mellitus with hypoglycemia, with long-term current use of insulin  Patient's FSGs are uncontrolled due to hypoglycemia on current medication regimen.  Last A1c reviewed-   Lab Results   Component Value Date    HGBA1C 6.1 10/13/2022     Most  recent fingerstick glucose reviewed-   Recent Labs   Lab 01/14/24  1000 01/14/24  1004   POCTGLUCOSE 28* 66*     Current correctional scale  Low  Decrease anti-hyperglycemic dose as follows-   Antihyperglycemics (From admission, onward)      Start     Stop Route Frequency Ordered    01/14/24 1058  insulin aspart U-100 pen 0-5 Units         -- SubQ Before meals & nightly PRN 01/14/24 0959          Hold Oral hypoglycemics while patient is in the hospital.  Hold basal insulin for now as hypoglycemic  Hypoglycemia protocol        Influenza A  -tamiflu  -doxycycline as recent pneumonia and unable to compare previous images to determine if infiltrates worsening  -supportive care  -supplemental oxygen      Non-small cell lung cancer  -followed by Dr. Laboy  -treatment currently being held  -outpatient follow-up with Dr. Laboy      Atrial fibrillation  Patient with Long standing persistent (>12 months) atrial fibrillation which is controlled currently with Beta Blocker and Amiodarone. Patient is currently paced.[id number] Score: 2. HASBLED Score: . Anticoagulation indicated. Anticoagulation done with warfarin .      VTE Risk Mitigation (From admission, onward)           Ordered     warfarin (COUMADIN) tablet 3 mg  Daily         01/14/24 0956                       On 01/14/2024, patient should be placed in hospital observation services under my care.             Ismael Sullivan MD  Department of Hospital Medicine  [place] - Emergency Dept.

## 2024-01-14 NOTE — PROGRESS NOTES
Pharmacy Consult Note: Warfarin     Chavez Kohler 's Coumadin will be dosed and monitored by Pharmacy.      Target INR goal is 2-3.    INR   Date Value Ref Range Status   01/14/2024 1.7 (H) 0.8 - 1.2 Final     Comment:     Coumadin Therapy:  2.0 - 3.0 for INR for all indicators except mechanical heart valves  and antiphospholipid syndromes which should use 2.5 - 3.5.         Indication: afib  Home dose: 3mg daily  Patient will be given a booster dose today then resume home regimen tomorrow.  Dose for Today: 5mg    Drug interactions: amiodarone, ASA, and doxycycline all increase anticoagulant effect    PT/INR will be monitored daily. Dose adjustments will be made accordingly.      Thank you for allowing us to participate in this patient's care.     Diana Franklin, PharmD 1/14/2024 10:30 AM

## 2024-01-15 LAB
ANION GAP SERPL CALC-SCNC: 13 MMOL/L (ref 8–16)
BUN SERPL-MCNC: 12 MG/DL (ref 8–23)
CALCIUM SERPL-MCNC: 8.2 MG/DL (ref 8.7–10.5)
CHLORIDE SERPL-SCNC: 101 MMOL/L (ref 95–110)
CO2 SERPL-SCNC: 30 MMOL/L (ref 23–29)
CREAT SERPL-MCNC: 1.1 MG/DL (ref 0.5–1.4)
EST. GFR  (NO RACE VARIABLE): >60 ML/MIN/1.73 M^2
ESTIMATED AVG GLUCOSE: 272 MG/DL (ref 68–131)
GLUCOSE SERPL-MCNC: 128 MG/DL (ref 70–110)
HBA1C MFR BLD: 11.1 % (ref 4–5.6)
INR PPP: 1.8 (ref 0.8–1.2)
MAGNESIUM SERPL-MCNC: 1.4 MG/DL (ref 1.6–2.6)
POCT GLUCOSE: 180 MG/DL (ref 70–110)
POCT GLUCOSE: 270 MG/DL (ref 70–110)
POCT GLUCOSE: 288 MG/DL (ref 70–110)
POTASSIUM SERPL-SCNC: 3.3 MMOL/L (ref 3.5–5.1)
PROTHROMBIN TIME: 18.4 SEC (ref 9–12.5)
SODIUM SERPL-SCNC: 144 MMOL/L (ref 136–145)

## 2024-01-15 PROCEDURE — 63600175 PHARM REV CODE 636 W HCPCS: Performed by: INTERNAL MEDICINE

## 2024-01-15 PROCEDURE — 25000003 PHARM REV CODE 250: Performed by: INTERNAL MEDICINE

## 2024-01-15 PROCEDURE — 63600175 PHARM REV CODE 636 W HCPCS: Performed by: HOSPITALIST

## 2024-01-15 PROCEDURE — 97530 THERAPEUTIC ACTIVITIES: CPT

## 2024-01-15 PROCEDURE — 96365 THER/PROPH/DIAG IV INF INIT: CPT

## 2024-01-15 PROCEDURE — 96366 THER/PROPH/DIAG IV INF ADDON: CPT

## 2024-01-15 PROCEDURE — 36415 COLL VENOUS BLD VENIPUNCTURE: CPT | Mod: XB | Performed by: HOSPITALIST

## 2024-01-15 PROCEDURE — 25000003 PHARM REV CODE 250: Performed by: HOSPITALIST

## 2024-01-15 PROCEDURE — 97166 OT EVAL MOD COMPLEX 45 MIN: CPT

## 2024-01-15 PROCEDURE — 96376 TX/PRO/DX INJ SAME DRUG ADON: CPT

## 2024-01-15 PROCEDURE — 85610 PROTHROMBIN TIME: CPT | Performed by: INTERNAL MEDICINE

## 2024-01-15 PROCEDURE — 36415 COLL VENOUS BLD VENIPUNCTURE: CPT | Performed by: INTERNAL MEDICINE

## 2024-01-15 PROCEDURE — 97162 PT EVAL MOD COMPLEX 30 MIN: CPT

## 2024-01-15 PROCEDURE — 83036 HEMOGLOBIN GLYCOSYLATED A1C: CPT | Performed by: HOSPITALIST

## 2024-01-15 PROCEDURE — G0378 HOSPITAL OBSERVATION PER HR: HCPCS

## 2024-01-15 PROCEDURE — 96372 THER/PROPH/DIAG INJ SC/IM: CPT | Performed by: INTERNAL MEDICINE

## 2024-01-15 PROCEDURE — 83735 ASSAY OF MAGNESIUM: CPT | Performed by: INTERNAL MEDICINE

## 2024-01-15 PROCEDURE — 80048 BASIC METABOLIC PNL TOTAL CA: CPT | Performed by: INTERNAL MEDICINE

## 2024-01-15 RX ORDER — BUMETANIDE 0.25 MG/ML
1 INJECTION INTRAMUSCULAR; INTRAVENOUS 2 TIMES DAILY
Status: DISCONTINUED | OUTPATIENT
Start: 2024-01-15 | End: 2024-01-18 | Stop reason: HOSPADM

## 2024-01-15 RX ORDER — MAGNESIUM SULFATE HEPTAHYDRATE 40 MG/ML
2 INJECTION, SOLUTION INTRAVENOUS ONCE
Status: COMPLETED | OUTPATIENT
Start: 2024-01-15 | End: 2024-01-15

## 2024-01-15 RX ORDER — POTASSIUM CHLORIDE 20 MEQ/1
40 TABLET, EXTENDED RELEASE ORAL 2 TIMES DAILY
Status: COMPLETED | OUTPATIENT
Start: 2024-01-15 | End: 2024-01-15

## 2024-01-15 RX ADMIN — BUMETANIDE 1 MG: 0.25 INJECTION INTRAMUSCULAR; INTRAVENOUS at 10:01

## 2024-01-15 RX ADMIN — POTASSIUM CHLORIDE 40 MEQ: 1500 TABLET, EXTENDED RELEASE ORAL at 10:01

## 2024-01-15 RX ADMIN — POTASSIUM CHLORIDE 40 MEQ: 1500 TABLET, EXTENDED RELEASE ORAL at 09:01

## 2024-01-15 RX ADMIN — WARFARIN SODIUM 3 MG: 2 TABLET ORAL at 05:01

## 2024-01-15 RX ADMIN — MAGNESIUM SULFATE HEPTAHYDRATE 2 G: 40 INJECTION, SOLUTION INTRAVENOUS at 10:01

## 2024-01-15 RX ADMIN — OSELTAMIVIR PHOSPHATE 75 MG: 75 CAPSULE ORAL at 09:01

## 2024-01-15 RX ADMIN — AMIODARONE HYDROCHLORIDE 200 MG: 200 TABLET ORAL at 09:01

## 2024-01-15 RX ADMIN — DOXYCYCLINE HYCLATE 100 MG: 100 TABLET, COATED ORAL at 09:01

## 2024-01-15 RX ADMIN — LEVOTHYROXINE SODIUM 150 MCG: 150 TABLET ORAL at 06:01

## 2024-01-15 RX ADMIN — BUMETANIDE 1 MG: 0.25 INJECTION INTRAMUSCULAR; INTRAVENOUS at 09:01

## 2024-01-15 RX ADMIN — INSULIN ASPART 3 UNITS: 100 INJECTION, SOLUTION INTRAVENOUS; SUBCUTANEOUS at 05:01

## 2024-01-15 RX ADMIN — INSULIN ASPART 1 UNITS: 100 INJECTION, SOLUTION INTRAVENOUS; SUBCUTANEOUS at 09:01

## 2024-01-15 NOTE — PLAN OF CARE
OT shen completed. Sit>stand CGA, functional mobility x30ft with RW and CGA, step>pivot to bedside chair with RW and CGA. Recommending moderate intensity intervention at d/c.

## 2024-01-15 NOTE — PROGRESS NOTES
"Clinical Pharmacy Progress Note: Coumadin Dosing and Monitoring     Indication: Afib  Goal INR: 2-3    Lab Results   Component Value Date    INR 1.8 (H) 01/15/2024    INR 1.7 (H) 01/14/2024    INR 1.4 01/05/2024     Patient has not yet been educated by pharmacist this admission. However, not a candidate for counseling because he is a nursing home resident.     Recent dosing history: Warfarin 5 mg booster dose given 1/14 (INR 1.7)  Home dosing regimen: Warfarin 3 mg PO daily, which was increased recently on 1/10 from prior regimen of 2.5 mg daily.   Drug interactions: amiodarone, acetaminophen, levothyroxine and doxycycline each may increase the risk of bleeding while on warfarin    Lab order for daily PT/INR? Yes - modified order by removing stop time.   Coumadin diet ordered? Yes - modified diet order with "coumadin restriction" in the comments.     Bridge? No. Okay to leave off per Dr. Layne.     Plan:   - Give warfarin 3 mg today. INR is sub-therapeutic, but we may not see the effect of yesterday's booster dose until tomorrow or Wednesday.   - Pharmacy will continue to monitor daily PT/INR. Dose adjustments will be made accordingly.      Thank you for allowing us to participate in this patient's care.      Savanna Cerda, PharmD 01/15/2024 2:10 PM    "

## 2024-01-15 NOTE — PLAN OF CARE
A209/A209 MICHEAL Kohler is a 83 y.o.male admitted on 1/14/2024 for Acute on chronic systolic heart failure   Code Status: Full Code MRN: [id number]   Review of patient's allergies indicates:   Allergen Reactions    Morphine Shortness Of Breath    Amoxicillin Nausea And Vomiting     Past Medical History:   Diagnosis Date    Acute kidney failure, unspecified     Chronic systolic heart failure     Current use of long term anticoagulation     Longstanding persistent atrial fibrillation     Metabolic encephalopathy     Mixed hyperlipidemia     Presence of aortocoronary bypass graft     Presence of cardiac pacemaker     Primary hypertension     Stage 3b chronic kidney disease (CKD)     Type 2 diabetes mellitus without complications       PRN meds    acetaminophen, 650 mg, Q4H PRN  albuterol-ipratropium, 3 mL, Q4H PRN  aluminum-magnesium hydroxide-simethicone, 30 mL, QID PRN  dextrose 10%, 12.5 g, PRN  dextrose 10%, 25 g, PRN  glucagon (human recombinant), 1 mg, PRN  glucose, 16 g, PRN  glucose, 24 g, PRN  guaiFENesin 100 mg/5 ml, 200 mg, Q4H PRN  insulin aspart U-100, 0-5 Units, QID (AC + HS) PRN  naloxone, 0.4 mg, PRN  ondansetron, 4 mg, Q8H PRN  simethicone, 1 tablet, QID PRN  sodium chloride 0.9%, 10 mL, PRN      Chart check completed. Will continue plan of care.      Orientation: oriented x 4  [place] Coma Scale Score: 15     Lead Monitored: Lead II Rhythm: normal sinus rhythm       VTE Required Core Measure: Pharmacological prophylaxis initiated/maintained Last Bowel Movement: 01/14/24  Diet Low Sodium, 2gm Fluid - 1500mL     Nishant Score: 18  Fall Risk Score: 13  Accucheck []   Freq?      Lines/Drains/Airways       Peripheral Intravenous Line  Duration                  Peripheral IV - Single Lumen 01/14/24 0440 20 G Anterior;Right Forearm <1 day                       Problem: Adult Inpatient Plan of Care  Goal: Plan of Care Review  Outcome: Ongoing, Progressing  Goal: Patient-Specific Goal  (Individualized)  Outcome: Ongoing, Progressing  Goal: Absence of Hospital-Acquired Illness or Injury  Outcome: Ongoing, Progressing  Goal: Optimal Comfort and Wellbeing  Outcome: Ongoing, Progressing  Goal: Readiness for Transition of Care  Outcome: Ongoing, Progressing     Problem: Diabetes Comorbidity  Goal: Blood Glucose Level Within Targeted Range  Outcome: Ongoing, Progressing     Problem: Fall Injury Risk  Goal: Absence of Fall and Fall-Related Injury  Outcome: Ongoing, Progressing     Problem: Skin Injury Risk Increased  Goal: Skin Health and Integrity  Outcome: Ongoing, Progressing

## 2024-01-15 NOTE — PLAN OF CARE
PT EVAL complete. Required CGA for bed mobility, ambulated 30ft CGA using RW. Recommending moderate intensity therapy upon d/c.

## 2024-01-15 NOTE — ASSESSMENT & PLAN NOTE
Patient's FSGs are uncontrolled due to hypoglycemia on current medication regimen.  Last A1c reviewed-   Lab Results   Component Value Date    HGBA1C 11.1 (H) 01/15/2024     Most recent fingerstick glucose reviewed-   Recent Labs   Lab 01/14/24  1738 01/14/24  2024 01/15/24  1140   POCTGLUCOSE 114* 163* 180*       Current correctional scale  Low  Decrease anti-hyperglycemic dose as follows-   Antihyperglycemics (From admission, onward)      Start     Stop Route Frequency Ordered    01/14/24 1058  insulin aspart U-100 pen 0-5 Units         -- SubQ Before meals & nightly PRN 01/14/24 0959          Hold Oral hypoglycemics while patient is in the hospital.  Hold basal insulin for now as hypoglycemic  Hypoglycemia protocol

## 2024-01-15 NOTE — ASSESSMENT & PLAN NOTE
Patient with Long standing persistent (>12 months) atrial fibrillation which is controlled currently with Beta Blocker and Amiodarone. Patient is currently paced.[id number] Score: 3. HASBLED Score: . Anticoagulation indicated. Anticoagulation done with warfarin .

## 2024-01-15 NOTE — ASSESSMENT & PLAN NOTE
Patient is identified as having Systolic (HFrEF) heart failure that is Acute on chronic. CHF is currently uncontrolled due to Pulmonary edema/pleural effusion on CXR. Latest ECHO performed and demonstrates- Patient Name: BILLY GAYTAN W   Patient ID: [id number]  Account #:   : 1940 (83y 2m)  Gender: M   Study Date: 2023 07:38:50 AM   Ht(Cm): 183  Wt(Kg): 77.57  BSA: 1.99   Accession #: [id number]   Sonographer: leonard  Location: [facility] Provider: NINI ZALDIVAR     Heart Rate: 71   Quality: The study images were of technically adequate quality.   Ref.Provider: NINI ZALDIVAR     -----------------------     CONCLUSIONS:   1. Dilated left ventricle with severe global hypokinesis and severely depressed left   ventricular systolic function with an EF<20%.   2. Dilated right atrium and ventricle; ICD lead noted in RA/RV.   3. Moderate mitral valve regurgitation.   4. Mild tricuspid valve regurgitation.   . Continue Beta Blocker and monitor clinical status closely. Monitor on telemetry. Patient is on CHF pathway.  Monitor strict Is&Os and daily weights.  Place on fluid restriction of 1.5 L. Continue to stress to patient importance of self efficacy and  on diet for CHF. Last BNP reviewed- and noted below   Recent Labs   Lab 24  0439   *     .  -patient 6 kg up  -IV bumex bid for diuresis  -monitor renal function closely will diuresing  -supplemental oxygen

## 2024-01-15 NOTE — SUBJECTIVE & OBJECTIVE
Review of Systems   All other systems reviewed and are negative.    Objective:     Vital Signs (Most Recent):  Temp: 97.6 °F (36.4 °C) (01/15/24 1555)  Pulse: 84 (01/15/24 1555)  Resp: 16 (01/15/24 1555)  BP: (!) 152/72 (01/15/24 1555)  SpO2: 100 % (01/15/24 1555) Vital Signs (24h Range):  Temp:  [97.2 °F (36.2 °C)-98.5 °F (36.9 °C)] 97.6 °F (36.4 °C)  Pulse:  [72-88] 84  Resp:  [16-19] 16  SpO2:  [90 %-100 %] 100 %  BP: (142-164)/(61-96) 152/72     Weight: 82 kg (180 lb 12.4 oz)  Body mass index is 24.52 kg/m².    Intake/Output Summary (Last 24 hours) at 1/15/2024 1623  Last data filed at 1/15/2024 1550  Gross per 24 hour   Intake 48.08 ml   Output 475 ml   Net -426.92 ml         Physical Exam  Constitutional:       General: He is not in acute distress.     Appearance: Normal appearance. He is ill-appearing.      Comments: Wearing 2L NC   Cardiovascular:      Rate and Rhythm: Normal rate and regular rhythm.      Heart sounds: No murmur heard.  Pulmonary:      Effort: Pulmonary effort is normal. No respiratory distress.      Breath sounds: Rales present. No wheezing.   Abdominal:      General: There is no distension.      Palpations: Abdomen is soft.      Tenderness: There is no abdominal tenderness.   Musculoskeletal:      Right lower leg: Edema present.      Left lower leg: Edema present.   Neurological:      Mental Status: He is alert.             Significant Labs: All pertinent labs within the past 24 hours have been reviewed.  Recent Lab Results  (Last 5 results in the past 24 hours)        01/15/24  1140   01/15/24  0441   01/15/24  0428   01/14/24 2024 01/14/24  1738        Anion Gap     13           BUN     12           Calcium     8.2           Chloride     101           CO2     30           Creatinine     1.1           eGFR     >60           Estimated Avg Glucose   272             Glucose     128           Hemoglobin A1C External   11.1  Comment: ADA Screening Guidelines:  5.7-6.4%  Consistent with  prediabetes  >or=6.5%  Consistent with diabetes    High levels of fetal hemoglobin interfere with the HbA1C  assay. Heterozygous hemoglobin variants (HbS, HgC, etc)do  not significantly interfere with this assay.   However, presence of multiple variants may affect accuracy.               INR     1.8  Comment: Coumadin Therapy:  2.0 - 3.0 for INR for all indicators except mechanical heart valves  and antiphospholipid syndromes which should use 2.5 - 3.5.             Magnesium      1.4           POCT Glucose 180       163   114       Potassium     3.3           Protime     18.4           Sodium     144                                  Significant Imaging: I have reviewed all pertinent imaging results/findings within the past 24 hours.    CTA Chest Non-Coronary (PE Studies)   Final Result      1.  No pulmonary embolism.   2.  The findings are most consistent with acute congestive heart failure with severe cardiomegaly, bilateral moderate pleural effusions, and interstitial and alveolar pulmonary edema.      All CT scans at [this location] are performed using dose modulation techniques as appropriate to a performed exam including the following:  Automated exposure control; adjustment of the mA and/or kV according to patient size (this includes techniques or standardized protocols for targeted exams where dose is matched to indication / reason for exam; i.e. extremities or head); use of iterative reconstruction technique.      Finalized on: 1/14/2024 6:53 AM By:  Asif Tyson MD   [facility]# [id number]      2024-01-14 06:56:03.974    [facility]      X-Ray Chest 1 View   Final Result      See above.         Electronically signed by: Royce Villanueva MD   Date:    01/14/2024   Time:    07:06

## 2024-01-15 NOTE — PT/OT/SLP EVAL
"Occupational Therapy Evaluation and Treatment    Name: Chavez Kohler  MRN: [id number]  Admitting Diagnosis: Acute on chronic systolic heart failure  Recent Surgery: * No surgery found *      Recommendations:     Discharge Recommendations: Moderate Intensity Therapy  Level of Assistance Recommended: 24 hours significant assistance  Discharge Equipment Recommendations: to be determined by next level of care  Barriers to discharge: None    Assessment:     Chavez Kohler is a 83 y.o. male with a medical diagnosis of Acute on chronic systolic heart failure. He presents with performance deficits affecting function including weakness, impaired endurance, impaired self care skills, impaired functional mobility, impaired balance, impaired cardiopulmonary response to activity.    Rehab Prognosis: Good and Fair; patient would benefit from acute OT services to address these deficits and reach maximum level of function.    Plan:     Patient to be seen 2 x/week to address the above listed problems via self-care/home management, therapeutic activities, therapeutic exercises  Plan of Care Expires: 01/29/24  Plan of Care Reviewed with: patient, grandchild(larisa)    Subjective     Chief Complaint: Reported "I will try."  Patient Comments/Goals: increase independence and return home  Pain/Comfort:  Pain Rating 1: 0/10    Social History:  Patient to acute from SNF. Information below from prior to initial hospitalization.  Living Environment: Patient lives with their spouse in a single story home with  ramp to enter.  Prior Level of Function: Prior to admission, patient was independent with ADLs and household distance ambulation. Used SPC for community distance ambulation.  Roles and Routines: Patient was driving limited distances prior to admission.  Equipment Used at Home: cane, straight  DME owned (not currently used): none  Assistance Upon Discharge: family    Objective:     Communicated with nurseEnrico, prior to session. Patient " found sitting edge of bed with oxygen, telemetry, peripheral IV, bed alarm upon OT entry to room.    General Precautions: Standard, fall, droplet, respiratory, hard of hearing   Orthopedic Precautions: N/A   Braces: N/A    Respiratory Status: Nasal cannula, flow 2 L/min    Occupational Performance    Gait belt applied - Yes    Bed Mobility:   EOB at entry and OOB at end of session    Functional Mobility/Transfers:  Sit <> Stand Transfer with contact guard assistance with rolling walker  Bed <> Chair Transfer using Step Transfer technique with contact guard assistance with rolling walker  Functional Mobility: Patient completed x30ft functional mobility with RW and CGA to increase dynamic standing balance and activity tolerance needed for ADL completion.  Provided with v/c for technique with transfers to increase safety and independence with completion.    Activities of Daily Living:  Upper Body Dressing: minimum assistance  Lower Body Dressing: maximal assistance    Cognitive/Visual Perceptual:  Cognitive/Psychosocial Skills:    -     Oriented to: Person, Place, Time, Situation  -     Follows Commands/attention: Follows one-step commands    Physical Exam:  Balance:    -     Sitting: supervision  -     Standing: contact guard assistance  Upper Extremity Range of Motion:     -       Right Upper Extremity: WFL  -       Left Upper Extremity: WFL  Upper Extremity Strength:    -       Right Upper Extremity: Deficits: grossly 4/5  -       Left Upper Extremity: Deficits: grossly 4/5   Strength:    -       Right Upper Extremity: Deficits: fair  -       Left Upper Extremity: Deficits: fair    AMPAC 6 Click ADL:  AMPAC Total Score: 16    Treatment & Education:  Therapist provided facilitation and instruction of proper body mechanics, energy conservation, and fall prevention strategies during tasks listed above  Patient educated on role of OT, POC, and goals for therapy  Patient educated on importance of OOB activities with  staff member assistance and sitting OOB majority of the day  Encouraged completion of B UE AROM therex throughout the day to increase functional strength and activity tolerance needed for ADL completion.    Patient left up in chair with all lines intact, call button in reach, chair alarm on, and granddaughter present.    GOALS:   Multidisciplinary Problems       Occupational Therapy Goals          Problem: Occupational Therapy    Goal Priority Disciplines Outcome Interventions   Occupational Therapy Goal     OT, PT/OT     Description: Goals to be met by: 1/29/24     Patient will increase functional independence with ADLs by performing:    Toileting from toilet with Contact Guard Assistance for hygiene and clothing management.   Toilet transfer to toilet with Contact Guard Assistance.  Increased functional strength in B UE grossly by 1/2 MM grade.                         History:     Past Medical History:   Diagnosis Date    Acute kidney failure, unspecified     Chronic systolic heart failure     Current use of long term anticoagulation     Longstanding persistent atrial fibrillation     Metabolic encephalopathy     Mixed hyperlipidemia     Presence of aortocoronary bypass graft     Presence of cardiac pacemaker     Primary hypertension     Stage 3b chronic kidney disease (CKD)     Type 2 diabetes mellitus without complications          Past Surgical History:   Procedure Laterality Date    ATRIAL CARDIAC PACEMAKER INSERTION      CARDIAC SURGERY      COLONOSCOPY      KNEE SURGERY         Time Tracking:     OT Date of Treatment: 01/15/24  OT Start Time: 1050  OT Stop Time: 1115  OT Total Time (min): 25 min    Billable Minutes: Evaluation 15 and Therapeutic Activity 10    Britta Tanner, OT  1/15/2024

## 2024-01-15 NOTE — PROGRESS NOTES
[facility] Medicine  Progress Note    Patient Name: Chavez Kohler  MRN: [id number]  Patient Class: OP- Observation   Admission Date: 1/14/2024  Length of Stay: 0 days  Attending Physician: Eleno Layne MD  Primary Care Provider: Tess, Primary Doctor        Subjective:     Principal Problem:Acute on chronic systolic heart failure        HPI:  The patient is an 82 yo male with past medical history of atrial fibrillation on Coumadin, CHF (EF 20%), diabetes, hypothyroidism, hypertension,lung cancer, CKD and HLD who presented to the ED from [facility] with shortness of breath and hypoglycemia. The patient recently treated for pneumonia and DKA at [facility]. He has gained weight since being at [facility] but family thought related to increase in oral intake. Patient had increasing pedal edema but just revealed to family while in the ED. He visited his wife yesterday at [facility]. He reported shortness of breath and was placed on 2L NC with improvement in his symptoms. He was also noted to be hypoglycemic. His daughter reports hyperglycemia yesterday. Patient responds to questions but goes back to sleep. Workup revealed bilateral pleural effusion and Flu A positive. Hospital medicine consulted.    Overview/Hospital Course:  1/15/24  NAEON, patient sitting in chair  Family at bedside, updated  Patient arrived from [facility]  Currently on 2L NC, doesn't use at home  Influenza A+, on tamilfu and doxycycline  BLE edema and lung sounds with crackles noted  Continue Bumex 1 mg IV BID      Review of Systems   All other systems reviewed and are negative.    Objective:     Vital Signs (Most Recent):  Temp: 97.6 °F (36.4 °C) (01/15/24 1555)  Pulse: 84 (01/15/24 1555)  Resp: 16 (01/15/24 1555)  BP: (!) 152/72 (01/15/24 1555)  SpO2: 100 % (01/15/24 1555) Vital Signs (24h Range):  Temp:  [97.2 °F (36.2 °C)-98.5 °F (36.9 °C)] 97.6 °F (36.4 °C)  Pulse:  [72-88] 84  Resp:  [16-19] 16  SpO2:  [90 %-100  %] 100 %  BP: (142-164)/(61-96) 152/72     Weight: 82 kg (180 lb 12.4 oz)  Body mass index is 24.52 kg/m².    Intake/Output Summary (Last 24 hours) at 1/15/2024 1623  Last data filed at 1/15/2024 1550  Gross per 24 hour   Intake 48.08 ml   Output 475 ml   Net -426.92 ml         Physical Exam  Constitutional:       General: He is not in acute distress.     Appearance: Normal appearance. He is ill-appearing.      Comments: Wearing 2L NC   Cardiovascular:      Rate and Rhythm: Normal rate and regular rhythm.      Heart sounds: No murmur heard.  Pulmonary:      Effort: Pulmonary effort is normal. No respiratory distress.      Breath sounds: Rales present. No wheezing.   Abdominal:      General: There is no distension.      Palpations: Abdomen is soft.      Tenderness: There is no abdominal tenderness.   Musculoskeletal:      Right lower leg: Edema present.      Left lower leg: Edema present.   Neurological:      Mental Status: He is alert.             Significant Labs: All pertinent labs within the past 24 hours have been reviewed.  Recent Lab Results  (Last 5 results in the past 24 hours)        01/15/24  1140   01/15/24  0441   01/15/24  0428   01/14/24  2024   01/14/24  1738        Anion Gap     13           BUN     12           Calcium     8.2           Chloride     101           CO2     30           Creatinine     1.1           eGFR     >60           Estimated Avg Glucose   272             Glucose     128           Hemoglobin A1C External   11.1  Comment: ADA Screening Guidelines:  5.7-6.4%  Consistent with prediabetes  >or=6.5%  Consistent with diabetes    High levels of fetal hemoglobin interfere with the HbA1C  assay. Heterozygous hemoglobin variants (HbS, HgC, etc)do  not significantly interfere with this assay.   However, presence of multiple variants may affect accuracy.               INR     1.8  Comment: Coumadin Therapy:  2.0 - 3.0 for INR for all indicators except mechanical heart valves  and  antiphospholipid syndromes which should use 2.5 - 3.5.             Magnesium      1.4           POCT Glucose 180       163   114       Potassium     3.3           Protime     18.4           Sodium     144                                  Significant Imaging: I have reviewed all pertinent imaging results/findings within the past 24 hours.    CTA Chest Non-Coronary (PE Studies)   Final Result      1.  No pulmonary embolism.   2.  The findings are most consistent with acute congestive heart failure with severe cardiomegaly, bilateral moderate pleural effusions, and interstitial and alveolar pulmonary edema.      All CT scans at [this location] are performed using dose modulation techniques as appropriate to a performed exam including the following:  Automated exposure control; adjustment of the mA and/or kV according to patient size (this includes techniques or standardized protocols for targeted exams where dose is matched to indication / reason for exam; i.e. extremities or head); use of iterative reconstruction technique.      Finalized on: 2024 6:53 AM By:  Asif Tyson MD   [facility]# [id number]      2024 06:56:03.974    [facility]      X-Ray Chest 1 View   Final Result      See above.         Electronically signed by: Royce Villanueva MD   Date:    2024   Time:    07:06            Assessment/Plan:      * Acute on chronic systolic heart failure  Patient is identified as having Systolic (HFrEF) heart failure that is Acute on chronic. CHF is currently uncontrolled due to Pulmonary edema/pleural effusion on CXR. Latest ECHO performed and demonstrates- Patient Name: BILLY GAYTAN W   Patient ID: [id number]  Account #:   : [id number] (83y 2m)  Gender: M   Study Date: 2023 07:38:50 AM   Ht(Cm): 183  Wt(Kg): 77.57  BSA: 1.99   Accession #: [id number]   Sonographer: leonard  Location: [facility] Provider: NINI ZALDIVAR     Heart Rate: 71   Quality: The study images were of technically adequate quality.    Ref.Provider: NINI ZALDIVAR     -----------------------     CONCLUSIONS:   1. Dilated left ventricle with severe global hypokinesis and severely depressed left   ventricular systolic function with an EF<20%.   2. Dilated right atrium and ventricle; ICD lead noted in RA/RV.   3. Moderate mitral valve regurgitation.   4. Mild tricuspid valve regurgitation.   . Continue Beta Blocker and monitor clinical status closely. Monitor on telemetry. Patient is on CHF pathway.  Monitor strict Is&Os and daily weights.  Place on fluid restriction of 1.5 L. Continue to stress to patient importance of self efficacy and  on diet for CHF. Last BNP reviewed- and noted below   Recent Labs   Lab 01/14/24  0439   *     .  -patient 6 kg up  -IV bumex bid for diuresis  -monitor renal function closely will diuresing  -supplemental oxygen    Influenza A  -tamiflu  -doxycycline as recent pneumonia and unable to compare previous images to determine if infiltrates worsening  -supportive care  -supplemental oxygen    Non-small cell lung cancer  -followed by Dr. Laboy  -treatment currently being held  -outpatient follow-up with Dr. Laboy    Atrial fibrillation  Patient with Long standing persistent (>12 months) atrial fibrillation which is controlled currently with Beta Blocker and Amiodarone. Patient is currently paced.[id number] Score: 3. HASBLED Score: . Anticoagulation indicated. Anticoagulation done with warfarin .    Anticoagulant long-term use  This patient has long term use on an anticoagulant with Select Anticoagulant(s): Warfarin/Coumadin. Their long term anticoagulation will be Held or Continued: continued. They are on long term anticoagulation due to Reason for Anticoagulation: Atrial fibrillation.   Pharmacy consulted to manage dosing of coumadin    Type 2 diabetes mellitus with hypoglycemia, with long-term current use of insulin  Patient's FSGs are uncontrolled due to hypoglycemia on current medication  regimen.  Last A1c reviewed-   Lab Results   Component Value Date    HGBA1C 11.1 (H) 01/15/2024     Most recent fingerstick glucose reviewed-   Recent Labs   Lab 01/14/24  1738 01/14/24  2024 01/15/24  1140   POCTGLUCOSE 114* 163* 180*       Current correctional scale  Low  Decrease anti-hyperglycemic dose as follows-   Antihyperglycemics (From admission, onward)      Start     Stop Route Frequency Ordered    01/14/24 1058  insulin aspart U-100 pen 0-5 Units         -- SubQ Before meals & nightly PRN 01/14/24 0959          Hold Oral hypoglycemics while patient is in the hospital.  Hold basal insulin for now as hypoglycemic  Hypoglycemia protocol      VTE Risk Mitigation (From admission, onward)           Ordered     warfarin tablet 3 mg  Daily         01/14/24 1029                    Discharge Planning   KWASI:      Code Status: Full Code   Is the patient medically ready for discharge?:     Reason for patient still in hospital (select all that apply): Patient trending condition, Laboratory test, and Treatment                     Eleno Layne MD  Department of Hospital Medicine   [facility] - Telemetry ([facility])

## 2024-01-15 NOTE — HOSPITAL COURSE
1/15/24  NAEON, patient sitting in chair  Family at bedside, updated  Patient arrived from [facility]  Currently on 2L NC, doesn't use at home  Influenza A+, on tamilfu and doxycycline  BLE edema and lung sounds with crackles noted  Continue Bumex 1 mg IV BID    1/16/24  NAEON, patient weaned to RA this afternoon  BNP elevated 864, continue IV diuretics  TTE from [facility] with EF 20%, consult Cardiology  PT/OT with reccs for moderate intensity therapy  Return to [facility] likely in 1-2 days    1/17/24  NAEON  Appears euvolemic, stable on RA  Stable for discharge to SNF  Medications adjusted this stay, started on ARB, BB, and bumex dose increased  Advised to follow up with established cardiologist, Dr. Diego

## 2024-01-15 NOTE — PT/OT/SLP EVAL
Physical Therapy Evaluation and Treatment    Patient Name: Chavez Kohler   MRN: [id number]  Recent Surgery: * No surgery found *      Recommendations:     Discharge Recommendations: Moderate Intensity Therapy   Discharge Equipment Recommendations: to be determined by next level of care   Barriers to discharge: None    Assessment:     Chavez Kohler is a 83 y.o. male admitted with a medical diagnosis of Acute on chronic systolic heart failure. He presents with the following impairments/functional limitations: weakness, impaired endurance, impaired functional mobility, gait instability, impaired balance, decreased safety awareness, decreased lower extremity function, impaired cardiopulmonary response to activity.    Patient currently demonstrates a need for moderate intensity therapy on a daily basis secondary to an acute decline from prior level of function.    Rehab Prognosis: Good; patient would benefit from acute PT services to address these deficits and reach maximum level of function.    Plan:     During this hospitalization, patient to be seen 3 x/week to address the above listed problems via gait training, therapeutic activities, therapeutic exercises    Plan of Care Expires: 01/29/24    Subjective     Chief Complaint: Pt is motivated to participate  Patient Comments/Goals: none stated  Pain/Comfort:  Pain Rating 1: 0/10    Social History:  Living Environment: Patient lives with their spouse in a single story home with number of outside stair(s): ramp. Reports he has been at a SNF since a recent admission prior to this one.   Prior Level of Function: Prior to admission, patient was independent, driving and retired, and ambulated household and community distances using SPC in community, no AD in house  Equipment Used at Home: cane, straight, built in shower chair  DME owned (not currently used): none  Assistance Upon Discharge: family    Objective:     Communicated with nurse Weston and epic chart review prior  "to session. Patient found sitting edge of bed with peripheral IV, oxygen, telemetry, bed alarm upon PT entry to room.    General Precautions: Standard, fall, droplet, respiratory   Orthopedic Precautions: N/A   Braces: N/A    Respiratory Status: Nasal cannula, flow 2 L/min    Exams:  Cognition: Patient is oriented to Person, Place, Time, Situation  RLE ROM: WFL  RLE Strength:  Grossly 4/5  LLE ROM: WFL  LLE Strength:  Grossly 4/5  Sensation:    -       Intact  Skin Integrity/Edema:     -       Skin integrity: Visible skin intact    Functional Mobility:  Gait belt applied - Yes  Bed Mobility  Seated EOB at start of session and returned to chair  Transfers  Sit to Stand: contact guard assistance with rolling walker  Bed to Chair: contact guard assistance with rolling walker using Step Transfer  Gait  Patient ambulated 30ft with rolling walker and contact guard assistance. Patient demonstrates occasional unsteady gait. No c/o dizziness, mild SOB with exertion, educated about pursed lip breathing technique and cued for use with mobility, no gross LOB, increased cues for safety. All lines remained intact throughout ambulation trail and portable Supplemental O2 2L utilized.  Balance  Sitting: stand by assistance  Standing: contact guard assistance    Therapeutic Activities and Exercises:   Pt educated on role of PT in acute care and POC. Educated on importance of OOB activities, activity pacing, and HEP (marching/hip flex, hip abd, heel slides/LAQ, quad sets, ankle pumps) in order to maintain/regain strength. Encouraged to sit up in chair for all meals. Educated on proper use of RW for safety and to reduce risk of falling. Educated on "call don't fall" policy and increased risk of falling due to weakness, instructed to utilize call bell for assistance with all transfers. Pt agreeable to all requests.    AM-PAC 6 CLICK MOBILITY  Total Score:16    Patient left up in chair with all lines intact, call button in reach, chair " alarm on, and family present.    GOALS:   Multidisciplinary Problems       Physical Therapy Goals          Problem: Physical Therapy    Goal Priority Disciplines Outcome Goal Variances Interventions   Physical Therapy Goal     PT, PT/OT      Description: Goals to be met by 1/29/24.  1. Pt will complete bed mobility SBA.  2. Pt will complete sit to stand SBA.  3. Pt will ambulate 200ft SBA using RW.  4. Pt will increase AMPAC score by 2 points to progress functional mobility.                       History:     Past Medical History:   Diagnosis Date    Acute kidney failure, unspecified     Chronic systolic heart failure     Current use of long term anticoagulation     Longstanding persistent atrial fibrillation     Metabolic encephalopathy     Mixed hyperlipidemia     Presence of aortocoronary bypass graft     Presence of cardiac pacemaker     Primary hypertension     Stage 3b chronic kidney disease (CKD)     Type 2 diabetes mellitus without complications        Past Surgical History:   Procedure Laterality Date    ATRIAL CARDIAC PACEMAKER INSERTION      CARDIAC SURGERY      COLONOSCOPY      KNEE SURGERY         Time Tracking:     PT Received On: 01/15/24  PT Start Time: 1052  PT Stop Time: 1117  PT Total Time (min): 25 min     Billable Minutes: Evaluation 15min and Therapeutic Activity 10min    1/15/2024

## 2024-01-16 PROBLEM — Z95.810 AICD (AUTOMATIC CARDIOVERTER/DEFIBRILLATOR) PRESENT: Status: ACTIVE | Noted: 2024-01-16

## 2024-01-16 LAB
ANION GAP SERPL CALC-SCNC: 13 MMOL/L (ref 8–16)
BNP SERPL-MCNC: 804 PG/ML (ref 0–99)
BUN SERPL-MCNC: 15 MG/DL (ref 8–23)
CALCIUM SERPL-MCNC: 8.3 MG/DL (ref 8.7–10.5)
CHLORIDE SERPL-SCNC: 98 MMOL/L (ref 95–110)
CO2 SERPL-SCNC: 30 MMOL/L (ref 23–29)
CREAT SERPL-MCNC: 1.2 MG/DL (ref 0.5–1.4)
EST. GFR  (NO RACE VARIABLE): >60 ML/MIN/1.73 M^2
GLUCOSE SERPL-MCNC: 233 MG/DL (ref 70–110)
INR PPP: 1.9 (ref 0.8–1.2)
POCT GLUCOSE: 225 MG/DL (ref 70–110)
POCT GLUCOSE: 293 MG/DL (ref 70–110)
POCT GLUCOSE: 352 MG/DL (ref 70–110)
POCT GLUCOSE: 370 MG/DL (ref 70–110)
POTASSIUM SERPL-SCNC: 4.3 MMOL/L (ref 3.5–5.1)
PROTHROMBIN TIME: 18.8 SEC (ref 9–12.5)
SODIUM SERPL-SCNC: 141 MMOL/L (ref 136–145)

## 2024-01-16 PROCEDURE — 25000003 PHARM REV CODE 250: Performed by: HOSPITALIST

## 2024-01-16 PROCEDURE — 97530 THERAPEUTIC ACTIVITIES: CPT | Mod: CQ

## 2024-01-16 PROCEDURE — 27000221 HC OXYGEN, UP TO 24 HOURS

## 2024-01-16 PROCEDURE — 96372 THER/PROPH/DIAG INJ SC/IM: CPT | Performed by: INTERNAL MEDICINE

## 2024-01-16 PROCEDURE — 85610 PROTHROMBIN TIME: CPT | Performed by: HOSPITALIST

## 2024-01-16 PROCEDURE — 21400001 HC TELEMETRY ROOM

## 2024-01-16 PROCEDURE — 63600175 PHARM REV CODE 636 W HCPCS: Performed by: HOSPITALIST

## 2024-01-16 PROCEDURE — 63600175 PHARM REV CODE 636 W HCPCS: Performed by: INTERNAL MEDICINE

## 2024-01-16 PROCEDURE — 27000207 HC ISOLATION

## 2024-01-16 PROCEDURE — 97535 SELF CARE MNGMENT TRAINING: CPT

## 2024-01-16 PROCEDURE — 99223 1ST HOSP IP/OBS HIGH 75: CPT | Mod: ,,, | Performed by: INTERNAL MEDICINE

## 2024-01-16 PROCEDURE — 97116 GAIT TRAINING THERAPY: CPT | Mod: CQ

## 2024-01-16 PROCEDURE — 96376 TX/PRO/DX INJ SAME DRUG ADON: CPT

## 2024-01-16 PROCEDURE — 94761 N-INVAS EAR/PLS OXIMETRY MLT: CPT

## 2024-01-16 PROCEDURE — 80048 BASIC METABOLIC PNL TOTAL CA: CPT | Performed by: INTERNAL MEDICINE

## 2024-01-16 PROCEDURE — 25000003 PHARM REV CODE 250: Performed by: INTERNAL MEDICINE

## 2024-01-16 PROCEDURE — 99900035 HC TECH TIME PER 15 MIN (STAT)

## 2024-01-16 PROCEDURE — 83880 ASSAY OF NATRIURETIC PEPTIDE: CPT | Performed by: HOSPITALIST

## 2024-01-16 PROCEDURE — 97530 THERAPEUTIC ACTIVITIES: CPT

## 2024-01-16 PROCEDURE — 36415 COLL VENOUS BLD VENIPUNCTURE: CPT | Performed by: HOSPITALIST

## 2024-01-16 RX ORDER — INSULIN ASPART 100 [IU]/ML
0-10 INJECTION, SOLUTION INTRAVENOUS; SUBCUTANEOUS
Status: DISCONTINUED | OUTPATIENT
Start: 2024-01-16 | End: 2024-01-18 | Stop reason: HOSPADM

## 2024-01-16 RX ORDER — METOPROLOL SUCCINATE 25 MG/1
25 TABLET, EXTENDED RELEASE ORAL DAILY
Status: DISCONTINUED | OUTPATIENT
Start: 2024-01-17 | End: 2024-01-18 | Stop reason: HOSPADM

## 2024-01-16 RX ORDER — LOSARTAN POTASSIUM 25 MG/1
25 TABLET ORAL DAILY
Status: DISCONTINUED | OUTPATIENT
Start: 2024-01-17 | End: 2024-01-18 | Stop reason: HOSPADM

## 2024-01-16 RX ORDER — GABAPENTIN 300 MG/1
300 CAPSULE ORAL NIGHTLY
Status: DISCONTINUED | OUTPATIENT
Start: 2024-01-16 | End: 2024-01-18 | Stop reason: HOSPADM

## 2024-01-16 RX ORDER — OSELTAMIVIR PHOSPHATE 30 MG/1
30 CAPSULE ORAL 2 TIMES DAILY
Status: DISCONTINUED | OUTPATIENT
Start: 2024-01-16 | End: 2024-01-18 | Stop reason: HOSPADM

## 2024-01-16 RX ADMIN — DOXYCYCLINE HYCLATE 100 MG: 100 TABLET, COATED ORAL at 09:01

## 2024-01-16 RX ADMIN — AMIODARONE HYDROCHLORIDE 200 MG: 200 TABLET ORAL at 09:01

## 2024-01-16 RX ADMIN — OSELTAMIVIR PHOSPHATE 75 MG: 75 CAPSULE ORAL at 09:01

## 2024-01-16 RX ADMIN — OSELTAMIVIR PHOSPHATE 30 MG: 30 CAPSULE ORAL at 09:01

## 2024-01-16 RX ADMIN — INSULIN ASPART 2 UNITS: 100 INJECTION, SOLUTION INTRAVENOUS; SUBCUTANEOUS at 07:01

## 2024-01-16 RX ADMIN — INSULIN ASPART 5 UNITS: 100 INJECTION, SOLUTION INTRAVENOUS; SUBCUTANEOUS at 12:01

## 2024-01-16 RX ADMIN — INSULIN DETEMIR 20 UNITS: 100 INJECTION, SOLUTION SUBCUTANEOUS at 09:01

## 2024-01-16 RX ADMIN — LEVOTHYROXINE SODIUM 150 MCG: 150 TABLET ORAL at 05:01

## 2024-01-16 RX ADMIN — INSULIN ASPART 10 UNITS: 100 INJECTION, SOLUTION INTRAVENOUS; SUBCUTANEOUS at 05:01

## 2024-01-16 RX ADMIN — INSULIN ASPART 5 UNITS: 100 INJECTION, SOLUTION INTRAVENOUS; SUBCUTANEOUS at 09:01

## 2024-01-16 RX ADMIN — WARFARIN SODIUM 3 MG: 2 TABLET ORAL at 05:01

## 2024-01-16 RX ADMIN — GABAPENTIN 300 MG: 300 CAPSULE ORAL at 09:01

## 2024-01-16 RX ADMIN — BUMETANIDE 1 MG: 0.25 INJECTION INTRAMUSCULAR; INTRAVENOUS at 09:01

## 2024-01-16 NOTE — CONSULTS
[facility] - Tele[facility] ([facility])  Cardiology  Consult Note    Patient Name: Chavez Kohler  MRN: [id number]  Admission Date: 1/14/2024  Hospital Length of Stay: 0 days  Code Status: Full Code   Attending Provider: Eleno Layne MD   Consulting Provider: Jonelle Tinajero MD  Primary Care Physician: Tess, Primary Doctor  Principal Problem:Acute on chronic systolic heart failure    Patient information was obtained from patient and ER records.     Inpatient consult to Cardiology  Consult performed by: Atif Tinajero MD  Consult ordered by: Eleno Layne MD        Subjective:     Chief Complaint:  chf      HPI:    hospital medicine h&P  The patient is an 82 yo male with past medical history of atrial fibrillation on Coumadin, CHF (EF 20%), diabetes, hypothyroidism, hypertension,lung cancer, CKD and HLD who presented to the ED from [facility] with shortness of breath and hypoglycemia. The patient recently treated for pneumonia and DKA at [facility]. He has gained weight since being at [place] but family thought related to increase in oral intake. Patient had increasing pedal edema but just revealed to family while in the ED. He visited his wife yesterday at [facility]. He reported shortness of breath and was placed on 2L NC with improvement in his symptoms. He was also noted to be hypoglycemic. His daughter reports hyperglycemia yesterday. Patient responds to questions but goes back to sleep. Workup revealed bilateral pleural effusion and Flu A positive. Hospital medicine consulted.     Cards consult requested due to history of cardiomyopathy s/p aicd who was in chf in the setting of flu infection. The patient was diuresed and has improved clinically. He is not tolerated entresto due to weight loss weakness and loss orf muscle mass by his report he follows at [facility] with DR EDWARD. HE WAS PLACED ON ATIVIRAL THERAPY HE APPEARS COMFORTABLE.has no leg edema. However his bnp is increased. He is on amiodarone and warfarin for  afib. Currently in sinus rhythm with rbbb.    Past Medical History:   Diagnosis Date    Acute kidney failure, unspecified     Chronic systolic heart failure     Current use of long term anticoagulation     Longstanding persistent atrial fibrillation     Metabolic encephalopathy     Mixed hyperlipidemia     Presence of aortocoronary bypass graft     Presence of cardiac pacemaker     Primary hypertension     Stage 3b chronic kidney disease (CKD)     Type 2 diabetes mellitus without complications        Past Surgical History:   Procedure Laterality Date    ATRIAL CARDIAC PACEMAKER INSERTION      CARDIAC SURGERY      COLONOSCOPY      KNEE SURGERY         Review of patient's allergies indicates:   Allergen Reactions    Morphine Shortness Of Breath    Amoxicillin Nausea And Vomiting       No current facility-administered medications on file prior to encounter.     Current Outpatient Medications on File Prior to Encounter   Medication Sig    amiodarone (PACERONE) 200 MG Tab Take 200 mg by mouth once daily.    ascorbic acid, vitamin C, (VITAMIN C) 500 MG tablet Take 500 mg by mouth 2 (two) times daily.    aspirin 81 mg Cap Take 81 mg by mouth once daily.    bumetanide (BUMEX) 0.5 MG Tab Take 0.5 mg by mouth.    colestipoL (COLESTID) 1 gram Tab Take 1 g by mouth.    gabapentin (NEURONTIN) 300 MG capsule Take 300 mg by mouth every evening.    insulin glargine,hum.rec.anlog (LANTUS SOLOSTAR U-100 INSULIN SUBQ) Inject 35 Units into the skin every evening.    insulin lispro 100 unit/mL pen Inject 0-15 Units into the skin.    levothyroxine (SYNTHROID) 150 MCG tablet Take 150 mcg by mouth before breakfast.    LIDOcaine 4 % PtMd Apply 1 patch topically.    metFORMIN (GLUCOPHAGE) 500 MG tablet Take 1,000 mg by mouth 2 (two) times daily with meals.    ondansetron (ZOFRAN-ODT) 4 MG TbDL Take 4 mg by mouth every 8 (eight) hours as needed.    warfarin (COUMADIN) 3 MG tablet Take 3 mg by mouth Daily.    zinc sulfate (ZINCATE) 50 mg zinc  (220 mg) capsule Take 1 capsule by mouth every morning.     Family History       Problem Relation (Age of Onset)    Cancer Mother, Father          Tobacco Use    Smoking status: Former     Types: Cigarettes    Smokeless tobacco: Never   Substance and Sexual Activity    Alcohol use: Not Currently    Drug use: Not on file    Sexual activity: Not on file     Review of Systems   Constitutional: Negative for malaise/fatigue.   Eyes:  Negative for blurred vision.   Cardiovascular:  Negative for chest pain, claudication, cyanosis, dyspnea on exertion, irregular heartbeat, leg swelling, near-syncope, orthopnea, palpitations and paroxysmal nocturnal dyspnea.   Respiratory:  Negative for cough, hemoptysis and shortness of breath.    Hematologic/Lymphatic: Negative for bleeding problem. Does not bruise/bleed easily.   Skin:  Negative for dry skin and itching.   Musculoskeletal:  Negative for falls, muscle weakness and myalgias.   Gastrointestinal:  Negative for abdominal pain, diarrhea, heartburn, hematemesis, hematochezia and melena.   Genitourinary:  Negative for flank pain and hematuria.   Neurological:  Negative for dizziness, focal weakness, headaches, light-headedness, numbness, paresthesias, seizures and weakness.   Psychiatric/Behavioral:  Negative for altered mental status and memory loss. The patient is not nervous/anxious.    Allergic/Immunologic: Negative for hives.     Objective:     Vital Signs (Most Recent):  Temp: 97.9 °F (36.6 °C) (01/16/24 1218)  Pulse: 81 (01/16/24 1218)  Resp: 19 (01/16/24 1218)  BP: (!) 162/74 (01/16/24 1218)  SpO2: (!) 91 % (01/16/24 1218) Vital Signs (24h Range):  Temp:  [97.6 °F (36.4 °C)-98.5 °F (36.9 °C)] 97.9 °F (36.6 °C)  Pulse:  [69-84] 81  Resp:  [14-20] 19  SpO2:  [91 %-100 %] 91 %  BP: (147-180)/(69-83) 162/74     Weight: 80.6 kg (177 lb 11.1 oz)  Body mass index is 24.1 kg/m².    SpO2: (!) 91 %         Intake/Output Summary (Last 24 hours) at 1/16/2024 7678  Last data filed at  1/16/2024 1303  Gross per 24 hour   Intake 48.08 ml   Output 1300 ml   Net -1251.92 ml       Lines/Drains/Airways       Peripheral Intravenous Line  Duration                  Peripheral IV - Single Lumen 01/14/24 0440 20 G Anterior;Right Forearm 2 days                     Physical Exam  Vitals and nursing note reviewed.   Constitutional:       General: He is not in acute distress.     Appearance: He is well-developed. He is not diaphoretic.   HENT:      Head: Normocephalic and atraumatic.   Eyes:      General:         Right eye: No discharge.         Left eye: No discharge.      Pupils: Pupils are equal, round, and reactive to light.   Neck:      Thyroid: No thyromegaly.      Vascular: No JVD.   Cardiovascular:      Rate and Rhythm: Normal rate and regular rhythm.      Pulses: Intact distal pulses.      Heart sounds: Murmur heard.      High-pitched blowing holosystolic murmur is present with a grade of 2/6 at the apex.      No friction rub. No gallop.   Pulmonary:      Effort: Pulmonary effort is normal. No respiratory distress.      Breath sounds: Normal breath sounds. No wheezing or rales.      Comments: Aicd site well healed.  Chest:      Chest wall: No tenderness.   Abdominal:      General: Bowel sounds are normal. There is no distension.      Palpations: Abdomen is soft.      Tenderness: There is no abdominal tenderness.   Musculoskeletal:         General: Normal range of motion.      Cervical back: Neck supple.      Right lower leg: No edema.      Left lower leg: No edema.   Skin:     General: Skin is warm and dry.      Findings: No erythema or rash.   Neurological:      General: No focal deficit present.      Mental Status: He is alert and oriented to person, place, and time.      Cranial Nerves: No cranial nerve deficit.   Psychiatric:         Mood and Affect: Mood normal.         Behavior: Behavior normal.          Significant Labs:   Recent Lab Results  (Last 5 results in the past 24 hours)         01/16/24  1219   01/16/24  0607   01/16/24  0349   01/15/24  1954   01/15/24  1704        Anion Gap     13           BNP     804  Comment: Values of less than 100 pg/ml are consistent with non-CHF populations.           BUN     15           Calcium     8.3           Chloride     98           CO2     30           Creatinine     1.2           eGFR     >60           Glucose     233           INR     1.9  Comment: Coumadin Therapy:  2.0 - 3.0 for INR for all indicators except mechanical heart valves  and antiphospholipid syndromes which should use 2.5 - 3.5.             POCT Glucose 352   225     288   270       Potassium     4.3           Protime     18.8           Sodium     141                                  Significant Imaging: X-Ray: CXR: X-Ray Chest 1 View (CXR):   Results for orders placed or performed during the hospital encounter of 01/14/24   X-Ray Chest 1 View    Narrative    EXAMINATION:  XR CHEST 1 VIEW    CLINICAL HISTORY:  Pneumonia., Shortness of breath;    COMPARISON:  None    FINDINGS:  Sternal wires and mediastinal clips noted.  Left AICD is present.    There is consolidation in the right upper lobe consistent with pneumonia.  There are increased interstitial markings in the right lung base    There are bilateral pleural effusions with atelectasis left lower lobe.      Impression    See above.      Electronically signed by: Royce Villanueva MD  Date:    01/14/2024  Time:    07:06     Assessment and Plan:     * Acute on chronic systolic heart failure  Being diuresed intolerant to entresto will add ar or ace  Will get [facility]    AICD (automatic cardioverter/defibrillator) present  No shocks continue pacer clinic care.    Anticoagulant long-term use  Continue coumadin    Type 2 diabetes mellitus with hypoglycemia, with long-term current use of insulin  Per primary team    Influenza A  Per hospital team.    Atrial fibrillation  On coumadin and  amio in sinus rhythm will b blockers.        VTE  Risk Mitigation (From admission, onward)           Ordered     warfarin tablet 3 mg  Daily         01/14/24 0169                    Thank you for your consult. I will follow-up with patient. Please contact us if you have any additional questions.    Jonelle Tinajero MD  Cardiology   [facility] - Telemetry ([facility])

## 2024-01-16 NOTE — ASSESSMENT & PLAN NOTE
Patient with Long standing persistent (>12 months) atrial fibrillation which is controlled currently with Beta Blocker and Amiodarone. Patient is currently paced.[id number] Score: 3. HASBLED Score: . Anticoagulation indicated. Anticoagulation done with warfarin .

## 2024-01-16 NOTE — SUBJECTIVE & OBJECTIVE
Review of Systems   All other systems reviewed and are negative.    Objective:     Vital Signs (Most Recent):  Temp: 97.9 °F (36.6 °C) (01/16/24 1218)  Pulse: 81 (01/16/24 1218)  Resp: 19 (01/16/24 1218)  BP: (!) 162/74 (01/16/24 1218)  SpO2: (!) 91 % (01/16/24 1218) Vital Signs (24h Range):  Temp:  [97.6 °F (36.4 °C)-98.5 °F (36.9 °C)] 97.9 °F (36.6 °C)  Pulse:  [69-84] 81  Resp:  [14-20] 19  SpO2:  [91 %-100 %] 91 %  BP: (147-180)/(69-83) 162/74     Weight: 80.6 kg (177 lb 11.1 oz)  Body mass index is 24.1 kg/m².    Intake/Output Summary (Last 24 hours) at 1/16/2024 1519  Last data filed at 1/16/2024 1303  Gross per 24 hour   Intake 48.08 ml   Output 1300 ml   Net -1251.92 ml           Physical Exam  Constitutional:       General: He is not in acute distress.     Appearance: Normal appearance. He is ill-appearing.   Cardiovascular:      Rate and Rhythm: Normal rate and regular rhythm.      Heart sounds: Murmur heard.   Pulmonary:      Effort: Pulmonary effort is normal. No respiratory distress.      Breath sounds: Rales present. No wheezing.   Abdominal:      General: There is no distension.      Palpations: Abdomen is soft.      Tenderness: There is no abdominal tenderness.   Neurological:      Mental Status: He is alert.             Significant Labs: All pertinent labs within the past 24 hours have been reviewed.  Recent Lab Results  (Last 5 results in the past 24 hours)        01/16/24  1219   01/16/24  0607   01/16/24  0349   01/15/24  1954   01/15/24  1704        Anion Gap     13           BNP     804  Comment: Values of less than 100 pg/ml are consistent with non-CHF populations.           BUN     15           Calcium     8.3           Chloride     98           CO2     30           Creatinine     1.2           eGFR     >60           Glucose     233           INR     1.9  Comment: Coumadin Therapy:  2.0 - 3.0 for INR for all indicators except mechanical heart valves  and antiphospholipid syndromes which  should use 2.5 - 3.5.             POCT Glucose 352   225     288   270       Potassium     4.3           Protime     18.8           Sodium     141                                  Significant Imaging: I have reviewed all pertinent imaging results/findings within the past 24 hours.    CTA Chest Non-Coronary (PE Studies)   Final Result      1.  No pulmonary embolism.   2.  The findings are most consistent with acute congestive heart failure with severe cardiomegaly, bilateral moderate pleural effusions, and interstitial and alveolar pulmonary edema.      All CT scans at [this location] are performed using dose modulation techniques as appropriate to a performed exam including the following:  Automated exposure control; adjustment of the mA and/or kV according to patient size (this includes techniques or standardized protocols for targeted exams where dose is matched to indication / reason for exam; i.e. extremities or head); use of iterative reconstruction technique.      Finalized on: 1/14/2024 6:53 AM By:  Asif Tyson MD   [facility]# [id number]      2024-01-14 06:56:03.974    [facility]      X-Ray Chest 1 View   Final Result      See above.         Electronically signed by: Royce Villanueva MD   Date:    01/14/2024   Time:    07:06

## 2024-01-16 NOTE — SUBJECTIVE & OBJECTIVE
Past Medical History:   Diagnosis Date    Acute kidney failure, unspecified     Chronic systolic heart failure     Current use of long term anticoagulation     Longstanding persistent atrial fibrillation     Metabolic encephalopathy     Mixed hyperlipidemia     Presence of aortocoronary bypass graft     Presence of cardiac pacemaker     Primary hypertension     Stage 3b chronic kidney disease (CKD)     Type 2 diabetes mellitus without complications        Past Surgical History:   Procedure Laterality Date    ATRIAL CARDIAC PACEMAKER INSERTION      CARDIAC SURGERY      COLONOSCOPY      KNEE SURGERY         Review of patient's allergies indicates:   Allergen Reactions    Morphine Shortness Of Breath    Amoxicillin Nausea And Vomiting       No current facility-administered medications on file prior to encounter.     Current Outpatient Medications on File Prior to Encounter   Medication Sig    amiodarone (PACERONE) 200 MG Tab Take 200 mg by mouth once daily.    ascorbic acid, vitamin C, (VITAMIN C) 500 MG tablet Take 500 mg by mouth 2 (two) times daily.    aspirin 81 mg Cap Take 81 mg by mouth once daily.    bumetanide (BUMEX) 0.5 MG Tab Take 0.5 mg by mouth.    colestipoL (COLESTID) 1 gram Tab Take 1 g by mouth.    gabapentin (NEURONTIN) 300 MG capsule Take 300 mg by mouth every evening.    insulin glargine,hum.rec.anlog (LANTUS SOLOSTAR U-100 INSULIN SUBQ) Inject 35 Units into the skin every evening.    insulin lispro 100 unit/mL pen Inject 0-15 Units into the skin.    levothyroxine (SYNTHROID) 150 MCG tablet Take 150 mcg by mouth before breakfast.    LIDOcaine 4 % PtMd Apply 1 patch topically.    metFORMIN (GLUCOPHAGE) 500 MG tablet Take 1,000 mg by mouth 2 (two) times daily with meals.    ondansetron (ZOFRAN-ODT) 4 MG TbDL Take 4 mg by mouth every 8 (eight) hours as needed.    warfarin (COUMADIN) 3 MG tablet Take 3 mg by mouth Daily.    zinc sulfate (ZINCATE) 50 mg zinc (220 mg) capsule Take 1 capsule by mouth every  morning.     Family History       Problem Relation (Age of Onset)    Cancer Mother, Father          Tobacco Use    Smoking status: Former     Types: Cigarettes    Smokeless tobacco: Never   Substance and Sexual Activity    Alcohol use: Not Currently    Drug use: Not on file    Sexual activity: Not on file     Review of Systems   Constitutional: Negative for malaise/fatigue.   Eyes:  Negative for blurred vision.   Cardiovascular:  Negative for chest pain, claudication, cyanosis, dyspnea on exertion, irregular heartbeat, leg swelling, near-syncope, orthopnea, palpitations and paroxysmal nocturnal dyspnea.   Respiratory:  Negative for cough, hemoptysis and shortness of breath.    Hematologic/Lymphatic: Negative for bleeding problem. Does not bruise/bleed easily.   Skin:  Negative for dry skin and itching.   Musculoskeletal:  Negative for falls, muscle weakness and myalgias.   Gastrointestinal:  Negative for abdominal pain, diarrhea, heartburn, hematemesis, hematochezia and melena.   Genitourinary:  Negative for flank pain and hematuria.   Neurological:  Negative for dizziness, focal weakness, headaches, light-headedness, numbness, paresthesias, seizures and weakness.   Psychiatric/Behavioral:  Negative for altered mental status and memory loss. The patient is not nervous/anxious.    Allergic/Immunologic: Negative for hives.     Objective:     Vital Signs (Most Recent):  Temp: 97.9 °F (36.6 °C) (01/16/24 1218)  Pulse: 81 (01/16/24 1218)  Resp: 19 (01/16/24 1218)  BP: (!) 162/74 (01/16/24 1218)  SpO2: (!) 91 % (01/16/24 1218) Vital Signs (24h Range):  Temp:  [97.6 °F (36.4 °C)-98.5 °F (36.9 °C)] 97.9 °F (36.6 °C)  Pulse:  [69-84] 81  Resp:  [14-20] 19  SpO2:  [91 %-100 %] 91 %  BP: (147-180)/(69-83) 162/74     Weight: 80.6 kg (177 lb 11.1 oz)  Body mass index is 24.1 kg/m².    SpO2: (!) 91 %         Intake/Output Summary (Last 24 hours) at 1/16/2024 1448  Last data filed at 1/16/2024 1303  Gross per 24 hour   Intake  48.08 ml   Output 1300 ml   Net -1251.92 ml       Lines/Drains/Airways       Peripheral Intravenous Line  Duration                  Peripheral IV - Single Lumen 01/14/24 0440 20 G Anterior;Right Forearm 2 days                     Physical Exam  Vitals and nursing note reviewed.   Constitutional:       General: He is not in acute distress.     Appearance: He is well-developed. He is not diaphoretic.   HENT:      Head: Normocephalic and atraumatic.   Eyes:      General:         Right eye: No discharge.         Left eye: No discharge.      Pupils: Pupils are equal, round, and reactive to light.   Neck:      Thyroid: No thyromegaly.      Vascular: No JVD.   Cardiovascular:      Rate and Rhythm: Normal rate and regular rhythm.      Pulses: Intact distal pulses.      Heart sounds: Murmur heard.      High-pitched blowing holosystolic murmur is present with a grade of 2/6 at the apex.      No friction rub. No gallop.   Pulmonary:      Effort: Pulmonary effort is normal. No respiratory distress.      Breath sounds: Normal breath sounds. No wheezing or rales.      Comments: Aicd site well healed.  Chest:      Chest wall: No tenderness.   Abdominal:      General: Bowel sounds are normal. There is no distension.      Palpations: Abdomen is soft.      Tenderness: There is no abdominal tenderness.   Musculoskeletal:         General: Normal range of motion.      Cervical back: Neck supple.      Right lower leg: No edema.      Left lower leg: No edema.   Skin:     General: Skin is warm and dry.      Findings: No erythema or rash.   Neurological:      General: No focal deficit present.      Mental Status: He is alert and oriented to person, place, and time.      Cranial Nerves: No cranial nerve deficit.   Psychiatric:         Mood and Affect: Mood normal.         Behavior: Behavior normal.          Significant Labs:   Recent Lab Results  (Last 5 results in the past 24 hours)        01/16/24  1219   01/16/24  0607   01/16/24  0349    01/15/24  1954   01/15/24  1704        Anion Gap     13           BNP     804  Comment: Values of less than 100 pg/ml are consistent with non-CHF populations.           BUN     15           Calcium     8.3           Chloride     98           CO2     30           Creatinine     1.2           eGFR     >60           Glucose     233           INR     1.9  Comment: Coumadin Therapy:  2.0 - 3.0 for INR for all indicators except mechanical heart valves  and antiphospholipid syndromes which should use 2.5 - 3.5.             POCT Glucose 352   225     288   270       Potassium     4.3           Protime     18.8           Sodium     141                                  Significant Imaging: X-Ray: CXR: X-Ray Chest 1 View (CXR):   Results for orders placed or performed during the hospital encounter of 01/14/24   X-Ray Chest 1 View    Narrative    EXAMINATION:  XR CHEST 1 VIEW    CLINICAL HISTORY:  Pneumonia., Shortness of breath;    COMPARISON:  None    FINDINGS:  Sternal wires and mediastinal clips noted.  Left AICD is present.    There is consolidation in the right upper lobe consistent with pneumonia.  There are increased interstitial markings in the right lung base    There are bilateral pleural effusions with atelectasis left lower lobe.      Impression    See above.      Electronically signed by: Royce Villanueva MD  Date:    01/14/2024  Time:    07:06

## 2024-01-16 NOTE — ASSESSMENT & PLAN NOTE
Patient is identified as having Systolic (HFrEF) heart failure that is Acute on chronic. CHF is currently uncontrolled due to Pulmonary edema/pleural effusion on CXR. Latest ECHO performed and demonstrates- Patient Name: BILLY GAYTAN W   Patient ID: [id number]  Account #:   : 1940 (83y 2m)  Gender: M   Study Date: 2023 07:38:50 AM   Ht(Cm): 183  Wt(Kg): 77.57  BSA: 1.99   Accession #: [id number]   Sonographer: [email or web address]  Location: [facility] Provider: NINI ZALDIVAR     Heart Rate: 71   Quality: The study images were of technically adequate quality.   Ref.Provider: NINI ZALDIVAR     -----------------------     CONCLUSIONS:   1. Dilated left ventricle with severe global hypokinesis and severely depressed left   ventricular systolic function with an EF<20%.   2. Dilated right atrium and ventricle; ICD lead noted in RA/RV.   3. Moderate mitral valve regurgitation.   4. Mild tricuspid valve regurgitation.   . Continue Beta Blocker and monitor clinical status closely. Monitor on telemetry. Patient is on CHF pathway.  Monitor strict Is&Os and daily weights.  Place on fluid restriction of 1.5 L. Continue to stress to patient importance of self efficacy and  on diet for CHF. Last BNP reviewed- and noted below   Recent Labs   Lab 24  0349   *     .  -patient 6 kg up  -IV bumex bid for diuresis  -monitor renal function closely will diuresing  -supplemental oxygen

## 2024-01-16 NOTE — PROGRESS NOTES
Clinical Pharmacy Progress Note: Coumadin Dosing and Monitoring      Indication: Afib  Goal INR: 2-3  INR = 1.9 trended up from 1.8 yesterday  Recent dosing history: Warfarin 5 mg booster dose given 1/14 (INR 1.7)  Home dosing regimen: Warfarin 3 mg PO daily, which was increased recently on 1/10 from prior regimen of 2.5 mg daily.   Drug interactions: amiodarone, acetaminophen, levothyroxine ,tamiflu and doxycycline each may increase the risk of bleeding while on warfarin   Continue 3 mg po daily today   Pharmacy will continue to monitor daily PT/INR. Dose adjustments will be made accordingly.       Thank you for allowing us to participate in this patient's care.       Becki Boyd [facility] 1/16/2024 12:27 PM

## 2024-01-16 NOTE — PROGRESS NOTES
Pharmacist Renal Dose Adjustment Note    Chavez Kohler is a 83 y.o. male being treated with the medication oseltamivir    Patient Data:    Vital Signs (Most Recent):  Temp: 97.9 °F (36.6 °C) (01/16/24 1218)  Pulse: 81 (01/16/24 1218)  Resp: 19 (01/16/24 1218)  BP: (!) 162/74 (01/16/24 1218)  SpO2: (!) 91 % (01/16/24 1218) Vital Signs (72h Range):  Temp:  [97.2 °F (36.2 °C)-98.5 °F (36.9 °C)]   Pulse:  [68-88]   Resp:  [14-22]   BP: (139-180)/(61-96)   SpO2:  [90 %-100 %]      Recent Labs   Lab 01/14/24  0439 01/15/24  0428 01/16/24  0349   CREATININE 1.0 1.1 1.2     Serum creatinine: 1.2 mg/dL 01/16/24 0349  Estimated creatinine clearance: 52.1 mL/min    Medication:oseltamivir 75 mg po bid will be changed to medication:oseltamivir 30 mg po bid per renal dosing protocol.   Pharmacist's Name: Becki Boyd [facility]  Pharmacist's Extension: [phone]

## 2024-01-16 NOTE — PT/OT/SLP PROGRESS
"Occupational Therapy   Treatment    Name: Chavez Kohler  MRN: [id number]  Admitting Diagnosis:  Acute on chronic systolic heart failure       Recommendations:     Discharge Recommendations: Moderate Intensity Therapy  Discharge Equipment Recommendations:  to be determined by next level of care  Barriers to discharge:  None    Assessment:     Chavez Kohler is a 83 y.o. male with a medical diagnosis of Acute on chronic systolic heart failure.  He presents with the following performance deficits affecting function are weakness, impaired endurance, impaired self care skills, impaired functional mobility, impaired balance, impaired cardiopulmonary response to activity.     Rehab Prognosis:  Good; patient would benefit from acute skilled OT services to address these deficits and reach maximum level of function.       Plan:     Patient to be seen 2 x/week to address the above listed problems via therapeutic activities, therapeutic exercises, self-care/home management  Plan of Care Expires: 01/29/24  Plan of Care Reviewed with: patient    Subjective     Chief Complaint: Reported "I am doing ok today."  Patient/Family Comments/goals:  increase independence and return home  Pain/Comfort:  Pain Rating 1: 0/10    Objective:     Communicated with: NurseEnrico, prior to session.  Patient found supine with peripheral IV, telemetry, bed alarm upon OT entry to room.    General Precautions: Standard, droplet, fall    Orthopedic Precautions:N/A  Braces: N/A  Respiratory Status: Room air. O2 sats 91% or > throughout session, including with exertion. RT, Stefanie, scottie.      Occupational Performance:     Bed Mobility:    Patient completed Supine to Sit with stand by assistance     Functional Mobility/Transfers:  Patient completed Sit <> Stand Transfer with contact guard assistance  with  rolling walker   Patient completed Bed <> Chair Transfer using Step Transfer technique with contact guard assistance with rolling " walker  Functional Mobility: Patient completed x60ft x2 trials functional mobility with RW and CGA to increase dynamic standing balance and activity tolerance needed for ADL completion.  Provided with additional cueing for techniques with transfers to maximize safety and independence with completion  Standing rest breaks as needed to decrease RR  Seated rest break between mobility trials (sit>stand from bedside chair with CGA)    Activities of Daily Living:  Grooming: setup completed from bedside chair/tray with setup for item retrieval and container management  Increased time for quality provided    AMPA 6 Click ADL: 16    Treatment & Education:  Patient tolerated intervention well overall. Continued improvements in activity tolerance and dynamic standing balance. Encouraged completion of B UE AROM therex throughout the day to increase functional strength and activity tolerance needed for ADL completion. Educated on benefits of OOB activity and importance of calling for A to transfer back to bed. Patient stated understanding and in agreement with POC.    Patient left up in chair with all lines intact, call button in reach, and chair alarm on    GOALS:   Multidisciplinary Problems       Occupational Therapy Goals          Problem: Occupational Therapy    Goal Priority Disciplines Outcome Interventions   Occupational Therapy Goal     OT, PT/OT Ongoing, Progressing    Description: Goals to be met by: 1/29/24     Patient will increase functional independence with ADLs by performing:    Toileting from toilet with Contact Guard Assistance for hygiene and clothing management.   Toilet transfer to toilet with Contact Guard Assistance.  Increased functional strength in B UE grossly by 1/2 MM grade.                         Time Tracking:     OT Date of Treatment: 01/16/24  OT Start Time: 0905  OT Stop Time: 0930  OT Total Time (min): 25 min    Billable Minutes:Self Care/Home Management 10  Therapeutic Activity  15    Britta Tanner, OT  1/16/2024

## 2024-01-16 NOTE — PT/OT/SLP PROGRESS
Physical Therapy  Treatment    Chavez Kohler   MRN: [id number]   Admitting Diagnosis: Acute on chronic systolic heart failure    PT Received On: 01/16/24  PT Start Time: 0850     PT Stop Time: 0915    PT Total Time (min): 25 min       Billable Minutes:  Gait Training 10 and Therapeutic Activity 15    Treatment Type: Treatment  PT/PTA: PTA     Number of PTA visits since last PT visit: 1       General Precautions: Standard, droplet, fall  Orthopedic Precautions: N/A  Braces: N/A  Respiratory Status: Nasal cannula, flow 2 L/min    Spiritual, Cultural Beliefs, Moravian Practices, Values that Affect Care: no    Subjective:  Communicated with patient's nurse, Tangee, and completed Epic chart review prior to session.  Patient agreed to PT session.     Pain/Comfort  Pain Rating 1: 0/10  Pain Rating Post-Intervention 1: 0/10    Objective:   Patient found with: peripheral IV, telemetry, bed alarm, oxygen    Supine > sit EOB: SBA    Forward scoot towards EOB: SBA    STS from EOB > RW: CGA (VC for hand placement)    60ft x2 trials w/ RW CGA    Stand pivot T/F to chair w/ RW: CGA    Reviewed AROM TE to BLE including: hip flex/ext, knee flex/ext, ankle PF/DF  To be completed a minimum of 10 reps for each LE in order to promote return of function, strength and ROM.     Educated patient on importance of increased tolerance to upright position and direct impact on CV endurance and strength. Patient encouraged to sit up in chair/ EOB, for a minimum of 2 consecutive hours, 3x per day. Encouraged patient to perform AROM TE to BLE throughout the day within all available planes of motion. Re enforced importance of utilizing call light to meet needs in room and not attempt to get up without staff assistance. Patient verbalized understanding and agreed to comply.     AM-PAC 6 CLICK MOBILITY  How much help from another person does this patient currently need?   1 = Unable, Total/Dependent Assistance  2 = A lot, Maximum/Moderate  Assistance  3 = A little, Minimum/Contact Guard/Supervision  4 = None, Modified [place]/Independent    Turning over in bed (including adjusting bedclothes, sheets and blankets)?: 3  Sitting down on and standing up from a chair with arms (e.g., wheelchair, bedside commode, etc.): 3  Moving from lying on back to sitting on the side of the bed?: 3  Moving to and from a bed to a chair (including a wheelchair)?: 3  Need to walk in hospital room?: 3  Climbing 3-5 steps with a railing?: 1 (NT)  Basic Mobility Total Score: 16    AM-PAC Raw Score CMS G-Code Modifier Level of Impairment Assistance   6 % Total / Unable   7 - 9 CM 80 - 100% Maximal Assist   10 - 14 CL 60 - 80% Moderate Assist   15 - 19 CK 40 - 60% Moderate Assist   20 - 22 CJ 20 - 40% Minimal Assist   23 CI 1-20% SBA / CGA   24 CH 0% Independent/ Mod I     Patient left up in chair with call button in reach and chair alarm on.    Assessment:  Chavez Kohler is a 83 y.o. male with a medical diagnosis of Acute on chronic systolic heart failure and presents with overall decline in functional mobility. Patient would continue to benefit from skilled PT to address functional limitations listed below in order to return to PLOF/decrease caregiver burden.     Rehab identified problem list/impairments: weakness, impaired endurance, impaired self care skills, impaired functional mobility, gait instability, impaired balance, decreased upper extremity function, decreased lower extremity function, decreased safety awareness, decreased ROM, impaired cardiopulmonary response to activity    Rehab potential is good.    Activity tolerance: Fair    Discharge recommendations: Moderate Intensity Therapy      Barriers to discharge:      Equipment recommendations: to be determined by next level of care     GOALS:   Multidisciplinary Problems       Physical Therapy Goals          Problem: Physical Therapy    Goal Priority Disciplines Outcome Goal Variances Interventions    Physical Therapy Goal     PT, PT/OT      Description: Goals to be met by 1/29/24.  1. Pt will complete bed mobility SBA.  2. Pt will complete sit to stand SBA.  3. Pt will ambulate 200ft SBA using RW.  4. Pt will increase AMPAC score by 2 points to progress functional mobility.                       PLAN:    Patient to be seen 3 x/week to address the above listed problems via gait training, therapeutic activities, therapeutic exercises  Plan of Care expires: 01/29/24  Plan of Care reviewed with: patient         01/16/2024

## 2024-01-16 NOTE — ASSESSMENT & PLAN NOTE
Patient's FSGs are uncontrolled due to hypoglycemia on current medication regimen.  Last A1c reviewed-   Lab Results   Component Value Date    HGBA1C 11.1 (H) 01/15/2024     Most recent fingerstick glucose reviewed-   Recent Labs   Lab 01/15/24  1704 01/15/24  1954 01/16/24  0607 01/16/24  1219   POCTGLUCOSE 270* 288* 225* 352*       Current correctional scale  Low  Decrease anti-hyperglycemic dose as follows-   Antihyperglycemics (From admission, onward)      Start     Stop Route Frequency Ordered    01/16/24 2100  insulin detemir U-100 (Levemir) pen 20 Units         -- SubQ Nightly 01/16/24 1521    01/16/24 1620  insulin aspart U-100 pen 0-10 Units         -- SubQ Before meals & nightly PRN 01/16/24 1521          Hold Oral hypoglycemics while patient is in the hospital.  Hold basal insulin for now as hypoglycemic  Hypoglycemia protocol

## 2024-01-16 NOTE — PROGRESS NOTES
[facility] - Telemetry ([facility])  [facility] Medicine  Progress Note    Patient Name: Chavez Kohler  MRN: [id number]  Patient Class: IP- Inpatient   Admission Date: 1/14/2024  Length of Stay: 0 days  Attending Physician: Eleno Layne MD  Primary Care Provider: Tess, Primary Doctor        Subjective:     Principal Problem:Acute on chronic systolic heart failure        HPI:  The patient is an 82 yo male with past medical history of atrial fibrillation on Coumadin, CHF (EF 20%), diabetes, hypothyroidism, hypertension,lung cancer, CKD and HLD who presented to the ED from [facility] with shortness of breath and hypoglycemia. The patient recently treated for pneumonia and DKA at [facility]. He has gained weight since being at [place] but family thought related to increase in oral intake. Patient had increasing pedal edema but just revealed to family while in the ED. He visited his wife yesterday at [facility]. He reported shortness of breath and was placed on 2L NC with improvement in his symptoms. He was also noted to be hypoglycemic. His daughter reports hyperglycemia yesterday. Patient responds to questions but goes back to sleep. Workup revealed bilateral pleural effusion and Flu A positive. Hospital medicine consulted.    Overview/Hospital Course:  1/15/24  NAEON, patient sitting in chair  Family at bedside, updated  Patient arrived from [facility]  Currently on 2L NC, doesn't use at home  Influenza A+, on tamilfu and doxycycline  BLE edema and lung sounds with crackles noted  Continue Bumex 1 mg IV BID    1/16/24  NAEON, patient weaned to RA this afternoon  BNP elevated 864, continue IV diuretics  TTE from [facility] with EF 20%, consult Cardiology  PT/OT with reccs for moderate intensity therapy  Return to [facility] likely in 1-2 days      Review of Systems   All other systems reviewed and are negative.    Objective:     Vital Signs (Most Recent):  Temp: 97.9 °F (36.6 °C) (01/16/24 1218)  Pulse: 81 (01/16/24  1218)  Resp: 19 (01/16/24 1218)  BP: (!) 162/74 (01/16/24 1218)  SpO2: (!) 91 % (01/16/24 1218) Vital Signs (24h Range):  Temp:  [97.6 °F (36.4 °C)-98.5 °F (36.9 °C)] 97.9 °F (36.6 °C)  Pulse:  [69-84] 81  Resp:  [14-20] 19  SpO2:  [91 %-100 %] 91 %  BP: (147-180)/(69-83) 162/74     Weight: 80.6 kg (177 lb 11.1 oz)  Body mass index is 24.1 kg/m².    Intake/Output Summary (Last 24 hours) at 1/16/2024 1519  Last data filed at 1/16/2024 1303  Gross per 24 hour   Intake 48.08 ml   Output 1300 ml   Net -1251.92 ml           Physical Exam  Constitutional:       General: He is not in acute distress.     Appearance: Normal appearance. He is ill-appearing.   Cardiovascular:      Rate and Rhythm: Normal rate and regular rhythm.      Heart sounds: Murmur heard.   Pulmonary:      Effort: Pulmonary effort is normal. No respiratory distress.      Breath sounds: Rales present. No wheezing.   Abdominal:      General: There is no distension.      Palpations: Abdomen is soft.      Tenderness: There is no abdominal tenderness.   Neurological:      Mental Status: He is alert.             Significant Labs: All pertinent labs within the past 24 hours have been reviewed.  Recent Lab Results  (Last 5 results in the past 24 hours)        01/16/24  1219   01/16/24  0607   01/16/24  0349   01/15/24  1954   01/15/24  1704        Anion Gap     13           BNP     804  Comment: Values of less than 100 pg/ml are consistent with non-CHF populations.           BUN     15           Calcium     8.3           Chloride     98           CO2     30           Creatinine     1.2           eGFR     >60           Glucose     233           INR     1.9  Comment: Coumadin Therapy:  2.0 - 3.0 for INR for all indicators except mechanical heart valves  and antiphospholipid syndromes which should use 2.5 - 3.5.             POCT Glucose 352   225     288   270       Potassium     4.3           Protime     18.8           Sodium     141                                   Significant Imaging: I have reviewed all pertinent imaging results/findings within the past 24 hours.    CTA Chest Non-Coronary (PE Studies)   Final Result      1.  No pulmonary embolism.   2.  The findings are most consistent with acute congestive heart failure with severe cardiomegaly, bilateral moderate pleural effusions, and interstitial and alveolar pulmonary edema.      All CT scans at [this location] are performed using dose modulation techniques as appropriate to a performed exam including the following:  Automated exposure control; adjustment of the mA and/or kV according to patient size (this includes techniques or standardized protocols for targeted exams where dose is matched to indication / reason for exam; i.e. extremities or head); use of iterative reconstruction technique.      Finalized on: 2024 6:53 AM By:  Asif Tyson MD   [facility]# [id number]      2024 06:56:03.974    [facility]      X-Ray Chest 1 View   Final Result      See above.         Electronically signed by: Royce Villanueva MD   Date:    2024   Time:    07:06            Assessment/Plan:      * Acute on chronic systolic heart failure  Patient is identified as having Systolic (HFrEF) heart failure that is Acute on chronic. CHF is currently uncontrolled due to Pulmonary edema/pleural effusion on CXR. Latest ECHO performed and demonstrates- Patient Name: BILLY GAYTAN W   Patient ID: [id number]  Account #:   : 1940 (83y 2m)  Gender: M   Study Date: 2023 07:38:50 AM   Ht(Cm): 183  Wt(Kg): 77.57  BSA: 1.99   Accession #: [id number]   Sonographer: leonard  Location: [facility] Provider: NINI ZALDIVAR     Heart Rate: 71   Quality: The study images were of technically adequate quality.   Ref.Provider: NINI ZALDIVAR     -----------------------     CONCLUSIONS:   1. Dilated left ventricle with severe global hypokinesis and severely depressed left   ventricular systolic function with an EF<20%.   2. Dilated right  atrium and ventricle; ICD lead noted in RA/RV.   3. Moderate mitral valve regurgitation.   4. Mild tricuspid valve regurgitation.   . Continue Beta Blocker and monitor clinical status closely. Monitor on telemetry. Patient is on CHF pathway.  Monitor strict Is&Os and daily weights.  Place on fluid restriction of 1.5 L. Continue to stress to patient importance of self efficacy and  on diet for CHF. Last BNP reviewed- and noted below   Recent Labs   Lab 01/16/24  0349   *     .  -patient 6 kg up  -IV bumex bid for diuresis  -monitor renal function closely will diuresing  -supplemental oxygen    Influenza A  -tamiflu  -doxycycline as recent pneumonia and unable to compare previous images to determine if infiltrates worsening  -supportive care  -supplemental oxygen    Non-small cell lung cancer  -followed by Dr. Laboy  -treatment currently being held  -outpatient follow-up with Dr. Laboy    Atrial fibrillation  Patient with Long standing persistent (>12 months) atrial fibrillation which is controlled currently with Beta Blocker and Amiodarone. Patient is currently paced.[id number] Score: 3. HASBLED Score: . Anticoagulation indicated. Anticoagulation done with warfarin .    Anticoagulant long-term use  This patient has long term use on an anticoagulant with Select Anticoagulant(s): Warfarin/Coumadin. Their long term anticoagulation will be Held or Continued: continued. They are on long term anticoagulation due to Reason for Anticoagulation: Atrial fibrillation.   Pharmacy consulted to manage dosing of coumadin    Type 2 diabetes mellitus with hypoglycemia, with long-term current use of insulin  Patient's FSGs are uncontrolled due to hypoglycemia on current medication regimen.  Last A1c reviewed-   Lab Results   Component Value Date    HGBA1C 11.1 (H) 01/15/2024     Most recent fingerstick glucose reviewed-   Recent Labs   Lab 01/15/24  1704 01/15/24  1954 01/16/24  0607 01/16/24  1219   POCTGLUCOSE 270*  288* 225* 352*       Current correctional scale  Low  Decrease anti-hyperglycemic dose as follows-   Antihyperglycemics (From admission, onward)      Start     Stop Route Frequency Ordered    01/16/24 2100  insulin detemir U-100 (Levemir) pen 20 Units         -- SubQ Nightly 01/16/24 1521    01/16/24 1620  insulin aspart U-100 pen 0-10 Units         -- SubQ Before meals & nightly PRN 01/16/24 1521          Hold Oral hypoglycemics while patient is in the hospital.  Hold basal insulin for now as hypoglycemic  Hypoglycemia protocol      VTE Risk Mitigation (From admission, onward)           Ordered     warfarin tablet 3 mg  Daily         01/14/24 1029                    Discharge Planning   KWASI:      Code Status: Full Code   Is the patient medically ready for discharge?:     Reason for patient still in hospital (select all that apply): Patient trending condition, Laboratory test, Treatment, and Consult recommendations                     Eleno Layne MD  Department of Hospital Medicine   [facility] - Telemetry ([facility])

## 2024-01-16 NOTE — HPI
hospital medicine h&P  The patient is an 84 yo male with past medical history of atrial fibrillation on Coumadin, CHF (EF 20%), diabetes, hypothyroidism, hypertension,lung cancer, CKD and HLD who presented to the ED from [facility] with shortness of breath and hypoglycemia. The patient recently treated for pneumonia and DKA at [facility]. He has gained weight since being at [place] but family thought related to increase in oral intake. Patient had increasing pedal edema but just revealed to family while in the ED. He visited his [place] yesterday at [place]. He reported shortness of breath and was placed on 2L NC with improvement in his symptoms. He was also noted to be hypoglycemic. His daughter reports hyperglycemia yesterday. Patient responds to questions but goes back to sleep. Workup revealed bilateral pleural effusion and Flu A positive. Hospital medicine consulted.     Cards consult requested due to history of cardiomyopathy s/p aicd who was in chf in the setting of flu infection. The patient was diuresed and has improved clinically. He is not tolerated entresto due to weight loss weakness and loss orf muscle mass by his report he follows at [facility] with DR EDWARD. HE WAS PLACED ON ATIVIRAL THERAPY HE APPEARS COMFORTABLE.has no leg edema. However his bnp is increased. He is on amiodarone and warfarin for afib. Currently in sinus rhythm with rbbb.

## 2024-01-17 LAB
ANION GAP SERPL CALC-SCNC: 7 MMOL/L (ref 8–16)
BUN SERPL-MCNC: 19 MG/DL (ref 8–23)
CALCIUM SERPL-MCNC: 8.2 MG/DL (ref 8.7–10.5)
CHLORIDE SERPL-SCNC: 100 MMOL/L (ref 95–110)
CO2 SERPL-SCNC: 36 MMOL/L (ref 23–29)
CREAT SERPL-MCNC: 1.2 MG/DL (ref 0.5–1.4)
EST. GFR  (NO RACE VARIABLE): >60 ML/MIN/1.73 M^2
GLUCOSE SERPL-MCNC: 112 MG/DL (ref 70–110)
INR PPP: 2.3 (ref 0.8–1.2)
MAGNESIUM SERPL-MCNC: 1.5 MG/DL (ref 1.6–2.6)
POCT GLUCOSE: 269 MG/DL (ref 70–110)
POCT GLUCOSE: 403 MG/DL (ref 70–110)
POCT GLUCOSE: 87 MG/DL (ref 70–110)
POTASSIUM SERPL-SCNC: 3.1 MMOL/L (ref 3.5–5.1)
PROTHROMBIN TIME: 22.6 SEC (ref 9–12.5)
SODIUM SERPL-SCNC: 143 MMOL/L (ref 136–145)

## 2024-01-17 PROCEDURE — 94761 N-INVAS EAR/PLS OXIMETRY MLT: CPT

## 2024-01-17 PROCEDURE — 85610 PROTHROMBIN TIME: CPT | Performed by: HOSPITALIST

## 2024-01-17 PROCEDURE — 80048 BASIC METABOLIC PNL TOTAL CA: CPT | Performed by: INTERNAL MEDICINE

## 2024-01-17 PROCEDURE — 36415 COLL VENOUS BLD VENIPUNCTURE: CPT | Performed by: HOSPITALIST

## 2024-01-17 PROCEDURE — 99232 SBSQ HOSP IP/OBS MODERATE 35: CPT | Mod: ,,, | Performed by: INTERNAL MEDICINE

## 2024-01-17 PROCEDURE — 97116 GAIT TRAINING THERAPY: CPT | Mod: CQ

## 2024-01-17 PROCEDURE — 27000207 HC ISOLATION

## 2024-01-17 PROCEDURE — 63600175 PHARM REV CODE 636 W HCPCS: Performed by: HOSPITALIST

## 2024-01-17 PROCEDURE — 25000003 PHARM REV CODE 250: Performed by: INTERNAL MEDICINE

## 2024-01-17 PROCEDURE — 97530 THERAPEUTIC ACTIVITIES: CPT | Mod: CQ

## 2024-01-17 PROCEDURE — 83735 ASSAY OF MAGNESIUM: CPT | Performed by: HOSPITALIST

## 2024-01-17 PROCEDURE — 25000003 PHARM REV CODE 250: Performed by: HOSPITALIST

## 2024-01-17 PROCEDURE — 21400001 HC TELEMETRY ROOM

## 2024-01-17 RX ORDER — POTASSIUM CHLORIDE 750 MG/1
10 TABLET, EXTENDED RELEASE ORAL 2 TIMES DAILY
Qty: 30 TABLET | Refills: 0
Start: 2024-01-17

## 2024-01-17 RX ORDER — LOSARTAN POTASSIUM 25 MG/1
25 TABLET ORAL DAILY
Qty: 30 TABLET | Refills: 0
Start: 2024-01-18 | End: 2025-01-17

## 2024-01-17 RX ORDER — WARFARIN 2.5 MG/1
2.5 TABLET ORAL ONCE
Status: COMPLETED | OUTPATIENT
Start: 2024-01-17 | End: 2024-01-17

## 2024-01-17 RX ORDER — BUMETANIDE 0.5 MG/1
1 TABLET ORAL DAILY
Qty: 30 TABLET | Refills: 0
Start: 2024-01-17

## 2024-01-17 RX ORDER — POTASSIUM CHLORIDE 20 MEQ/1
40 TABLET, EXTENDED RELEASE ORAL 2 TIMES DAILY
Status: DISCONTINUED | OUTPATIENT
Start: 2024-01-17 | End: 2024-01-18 | Stop reason: HOSPADM

## 2024-01-17 RX ORDER — METOPROLOL SUCCINATE 25 MG/1
25 TABLET, EXTENDED RELEASE ORAL DAILY
Qty: 30 TABLET | Refills: 0
Start: 2024-01-18 | End: 2025-01-17

## 2024-01-17 RX ORDER — MAGNESIUM SULFATE HEPTAHYDRATE 40 MG/ML
2 INJECTION, SOLUTION INTRAVENOUS ONCE
Status: COMPLETED | OUTPATIENT
Start: 2024-01-17 | End: 2024-01-17

## 2024-01-17 RX ADMIN — INSULIN DETEMIR 20 UNITS: 100 INJECTION, SOLUTION SUBCUTANEOUS at 08:01

## 2024-01-17 RX ADMIN — INSULIN ASPART 5 UNITS: 100 INJECTION, SOLUTION INTRAVENOUS; SUBCUTANEOUS at 08:01

## 2024-01-17 RX ADMIN — LEVOTHYROXINE SODIUM 150 MCG: 150 TABLET ORAL at 06:01

## 2024-01-17 RX ADMIN — POTASSIUM CHLORIDE 40 MEQ: 1500 TABLET, EXTENDED RELEASE ORAL at 08:01

## 2024-01-17 RX ADMIN — OSELTAMIVIR PHOSPHATE 30 MG: 30 CAPSULE ORAL at 08:01

## 2024-01-17 RX ADMIN — METOPROLOL SUCCINATE 25 MG: 25 TABLET, EXTENDED RELEASE ORAL at 08:01

## 2024-01-17 RX ADMIN — DOXYCYCLINE HYCLATE 100 MG: 100 TABLET, COATED ORAL at 08:01

## 2024-01-17 RX ADMIN — GABAPENTIN 300 MG: 300 CAPSULE ORAL at 08:01

## 2024-01-17 RX ADMIN — POTASSIUM CHLORIDE 40 MEQ: 1500 TABLET, EXTENDED RELEASE ORAL at 11:01

## 2024-01-17 RX ADMIN — MAGNESIUM SULFATE HEPTAHYDRATE 2 G: 40 INJECTION, SOLUTION INTRAVENOUS at 11:01

## 2024-01-17 RX ADMIN — BUMETANIDE 1 MG: 0.25 INJECTION INTRAMUSCULAR; INTRAVENOUS at 08:01

## 2024-01-17 RX ADMIN — WARFARIN SODIUM 2.5 MG: 2.5 TABLET ORAL at 04:01

## 2024-01-17 RX ADMIN — LOSARTAN POTASSIUM 25 MG: 25 TABLET, FILM COATED ORAL at 08:01

## 2024-01-17 RX ADMIN — AMIODARONE HYDROCHLORIDE 200 MG: 200 TABLET ORAL at 08:01

## 2024-01-17 RX ADMIN — INSULIN ASPART 6 UNITS: 100 INJECTION, SOLUTION INTRAVENOUS; SUBCUTANEOUS at 04:01

## 2024-01-17 NOTE — PROGRESS NOTES
Clinical Pharmacy Progress Note: Coumadin Dosing and Monitoring      Indication: Afib  Goal INR: 2-3  INR = 2.3 today, trended up from 1.9 yesterday  Recent home dosing regimen was increase from 2.5 to 3 mg po daily   Booster dose of 5 mg noted on 1/14/24  3 mg given on 1/15 and 1/16  Acute on chronic systolic heart failure noted on admit  Drug interactions: amiodarone, acetaminophen, levothyroxine and doxycycline each may increase the risk of bleeding while on warfarin   will give a 2.5 mg po x 1 dose today      Estimated Creatinine Clearance: 52.1 mL/min (based on SCr of 1.2 mg/dL).   Body mass index is 24.1 kg/m².   Lab Results   Component Value Date    WBC 5.06 01/14/2024    HGB 8.5 (L) 01/14/2024    HCT 28.4 (L) 01/14/2024    MCV 87 01/14/2024     01/14/2024         Pharmacy will continue to monitor daily PT/INR. Dose adjustments will be made accordingly.       Thank you for allowing us to participate in this patient's care.    /Becki Boyd [facility] 1/17/2024 10:56 AM

## 2024-01-17 NOTE — ASSESSMENT & PLAN NOTE
Patient is identified as having Systolic (HFrEF) heart failure that is Acute on chronic. CHF is currently uncontrolled due to Pulmonary edema/pleural effusion on CXR. Latest ECHO performed and demonstrates- Patient Name: BILLY GAYTAN W   Patient ID: [id number]  Account #:   : 1940 (83y 2m)  Gender: M   Study Date: 2023 07:38:50 AM   Ht(Cm): 183  Wt(Kg): 77.57  BSA: 1.99   Accession #: [id number]   Sonographer: leonard  Location: [facility] Provider: NINI ZALDIVAR     Heart Rate: 71   Quality: The study images were of technically adequate quality.   Ref.Provider: NINI ZALDIVAR     -----------------------     CONCLUSIONS:   1. Dilated left ventricle with severe global hypokinesis and severely depressed left   ventricular systolic function with an EF<20%.   2. Dilated right atrium and ventricle; ICD lead noted in RA/RV.   3. Moderate mitral valve regurgitation.   4. Mild tricuspid valve regurgitation.   . Continue Beta Blocker and monitor clinical status closely. Monitor on telemetry. Patient is on CHF pathway.  Monitor strict Is&Os and daily weights.  Place on fluid restriction of 1.5 L. Continue to stress to patient importance of self efficacy and  on diet for CHF. Last BNP reviewed- and noted below   Recent Labs   Lab 24  0349   *     .  -patient 6 kg up  -IV bumex bid for diuresis  -monitor renal function closely will diuresing  -supplemental oxygen    24  Euvolemic, at baseline today  Stable for d/c to SNF  Increased bumex to 1 mg daily for discharge

## 2024-01-17 NOTE — ASSESSMENT & PLAN NOTE
-tamiflu and oxycycline as recent pneumonia and unable to compare previous images to determine if infiltrates worsening  -supportive care  -supplemental oxygen  -Complete course of tamiflu and dox

## 2024-01-17 NOTE — ASSESSMENT & PLAN NOTE
Patient with Long standing persistent (>12 months) atrial fibrillation which is controlled currently with Beta Blocker and Amiodarone. Patient is currently paced.[id number] Score: 3. HASBLED Score: . Anticoagulation indicated. Anticoagulation done with warfarin .

## 2024-01-17 NOTE — DISCHARGE SUMMARY
[facility] - Tele[facility] ([facility])  [facility] Medicine  Discharge Summary      Patient Name: Chavez Kohler  MRN: [id number]  COLLIN: [id number]  Patient Class: IP- Inpatient  Admission Date: 1/14/2024  Hospital Length of Stay: 1 days  Discharge Date and Time: No discharge date for patient encounter.  Attending Physician: Eleno Layne MD   Discharging Provider: Eleno Layne MD  Primary Care Provider: Tess Primary Doctor    Primary Care Team: [facility] MEDICINE C    HPI:   The patient is an 82 yo male with past medical history of atrial fibrillation on Coumadin, CHF (EF 20%), diabetes, hypothyroidism, hypertension,lung cancer, CKD and HLD who presented to the ED from [facility] with shortness of breath and hypoglycemia. The patient recently treated for pneumonia and DKA at [facility]. He has gained weight since being at [facility] but family thought related to increase in oral intake. Patient had increasing pedal edema but just revealed to family while in the ED. He visited his wife yesterday at [facility]. He reported shortness of breath and was placed on 2L NC with improvement in his symptoms. He was also noted to be hypoglycemic. His daughter reports hyperglycemia yesterday. Patient responds to questions but goes back to sleep. Workup revealed bilateral pleural effusion and Flu A positive. Hospital medicine consulted.    * No surgery found *      Hospital Course:   1/15/24  NAEON, patient sitting in chair  Family at bedside, updated  Patient arrived from [facility]  Currently on 2L NC, doesn't use at home  Influenza A+, on tamilfu and doxycycline  BLE edema and lung sounds with crackles noted  Continue Bumex 1 mg IV BID    1/16/24  NAEON, patient weaned to RA this afternoon  BNP elevated 864, continue IV diuretics  TTE from [facility] with EF 20%, consult Cardiology  PT/OT with reccs for moderate intensity therapy  Return to [facility] likely in 1-2 days    1/17/24  NAEON  Appears euvolemic, stable on RA  Stable  for discharge to SNF  Medications adjusted this stay, started on ARB, BB, and bumex dose increased  Advised to follow up with established cardiologist, Dr. Diego     Goals of Care Treatment Preferences:  Code Status: Full Code      Consults:   Consults (From admission, onward)          Status Ordering Provider     Inpatient consult to Social Work  Once        Provider:  (Not yet assigned)    Ordered MORALES, RATNA     Inpatient consult to Social Work  Once        Provider:  (Not yet assigned)    Acknowledged MORALES, RATNA     Inpatient consult to Cardiology  Once        Provider:  Atif Tinajero MD    Completed MORALES, RATNA     Pharmacy to dose Warfarin consult  Once        Provider:  (Not yet assigned)    Acknowledged MANISHA LAGUNAS     Inpatient consult to Social Work/Case Management  Once        Provider:  (Not yet assigned)    Completed MANISHA LAGUNAS            Cardiac/Vascular  * Acute on chronic systolic heart failure  Patient is identified as having Systolic (HFrEF) heart failure that is Acute on chronic. CHF is currently uncontrolled due to Pulmonary edema/pleural effusion on CXR. Latest ECHO performed and demonstrates- Patient Name: BILLY GAYTAN W   Patient ID: [id number]  Account #:   : [id number] (83y 2m)  Gender: M   Study Date: 2023 07:38:50 AM   Ht(Cm): 183  Wt(Kg): 77.57  BSA: 1.99   Accession #: [id number]   Sonographer: leonard  Location: [facility] Provider: NINI ZALDIVAR     Heart Rate: 71   Quality: The study images were of technically adequate quality.   Ref.Provider: NINI ZALDIVAR     -----------------------     CONCLUSIONS:   1. Dilated left ventricle with severe global hypokinesis and severely depressed left   ventricular systolic function with an EF<20%.   2. Dilated right atrium and ventricle; ICD lead noted in RA/RV.   3. Moderate mitral valve regurgitation.   4. Mild tricuspid valve regurgitation.   . Continue Beta Blocker and monitor clinical status  closely. Monitor on telemetry. Patient is on CHF pathway.  Monitor strict Is&Os and daily weights.  Place on fluid restriction of 1.5 L. Continue to stress to patient importance of self efficacy and  on diet for CHF. Last BNP reviewed- and noted below   Recent Labs   Lab 01/16/24  0349   *     .  -patient 6 kg up  -IV bumex bid for diuresis  -monitor renal function closely will diuresing  -supplemental oxygen    1/17/24  Euvolemic, at baseline today  Stable for d/c to SNF  Increased bumex to 1 mg daily for discharge    Atrial fibrillation  Patient with Long standing persistent (>12 months) atrial fibrillation which is controlled currently with Beta Blocker and Amiodarone. Patient is currently paced.[id number] Score: 3. HASBLED Score: . Anticoagulation indicated. Anticoagulation done with warfarin .    ID  Influenza A  -tamiflu and oxycycline as recent pneumonia and unable to compare previous images to determine if infiltrates worsening  -supportive care  -supplemental oxygen  -Complete course of tamiflu and dox    Oncology  Non-small cell lung cancer  -followed by Dr. Laboy  -treatment currently being held  -outpatient follow-up with Dr. Laboy    Endocrine  Type 2 diabetes mellitus with hypoglycemia, with long-term current use of insulin  Patient's FSGs are uncontrolled due to hypoglycemia on current medication regimen.  Last A1c reviewed-   Lab Results   Component Value Date    HGBA1C 11.1 (H) 01/15/2024     Most recent fingerstick glucose reviewed-   Recent Labs   Lab 01/16/24  1219 01/16/24  1707 01/16/24  2052 01/17/24  0609   POCTGLUCOSE 352* 293* 370* 87       Current correctional scale  Low  Decrease anti-hyperglycemic dose as follows-   Antihyperglycemics (From admission, onward)      Start     Stop Route Frequency Ordered    01/16/24 2100  insulin detemir U-100 (Levemir) pen 20 Units         -- SubQ Nightly 01/16/24 1521    01/16/24 1620  insulin aspart U-100 pen 0-10 Units         -- SubQ  Before meals & nightly PRN 01/16/24 1521          Hold Oral hypoglycemics while patient is in the hospital.  Hold basal insulin for now as hypoglycemic  Hypoglycemia protocol    Resume home regimen on discharge    Other  Anticoagulant long-term use  This patient has long term use on an anticoagulant with Select Anticoagulant(s): Warfarin/Coumadin. Their long term anticoagulation will be Held or Continued: continued. They are on long term anticoagulation due to Reason for Anticoagulation: Atrial fibrillation.   Pharmacy consulted to manage dosing of coumadin      Final Active Diagnoses:    Diagnosis Date Noted POA    PRINCIPAL PROBLEM:  Acute on chronic systolic heart failure [I50.23] 01/14/2024 Yes    Influenza A [J10.1] 01/14/2024 Yes    Non-small cell lung cancer [C34.90] 01/14/2024 Yes    Atrial fibrillation [I48.91] 01/14/2024 Yes    Anticoagulant long-term use [Z79.01] 01/14/2024 Not Applicable    Type 2 diabetes mellitus with hypoglycemia, with long-term current use of insulin [E11.649, Z79.4] 01/14/2024 Not Applicable    AICD (automatic cardioverter/defibrillator) present [Z95.810] 01/16/2024 Yes      Problems Resolved During this Admission:       Discharged Condition: stable    Disposition: Skilled Nursing Facility    Follow Up:   Follow-up Information       Min Diego MD. Schedule an appointment as soon as possible for a visit in 1 week(s).    Specialty: Vascular Surgery  Why: Hospital discharge follow up  Contact information:  [address] [address] [phone]                           Patient Instructions:      Call MD for:  temperature >100.4     Call MD for:  persistent nausea and vomiting or diarrhea     Call MD for:  severe uncontrolled pain     Call MD for:  redness, tenderness, or signs of infection (pain, swelling, redness, odor or green/yellow discharge around incision site)     Call MD for:  difficulty breathing or increased cough     Call MD for:  severe persistent  headache     Call MD for:  worsening rash     Call MD for:  persistent dizziness, light-headedness, or visual disturbances     Call MD for:  increased confusion or weakness       Significant Diagnostic Studies: N/A    Pending Diagnostic Studies:       None           Medications:  Reconciled Home Medications:      Medication List        START taking these medications      losartan 25 MG tablet  Commonly known as: COZAAR  Take 1 tablet (25 mg total) by mouth once daily.  Start taking on: January 18, 2024     metoprolol succinate 25 MG 24 hr tablet  Commonly known as: TOPROL-XL  Take 1 tablet (25 mg total) by mouth once daily.  Start taking on: January 18, 2024     potassium chloride SA 10 MEQ tablet  Commonly known as: K-DUR,KLOR-CON M  Take 1 tablet (10 mEq total) by mouth 2 (two) times daily.            CHANGE how you take these medications      bumetanide 0.5 MG Tab  Commonly known as: BUMEX  Take 2 tablets (1 mg total) by mouth once daily.  What changed:   how much to take  when to take this            CONTINUE taking these medications      amiodarone 200 MG Tab  Commonly known as: PACERONE  Take 200 mg by mouth once daily.     ascorbic acid (vitamin C) 500 MG tablet  Commonly known as: VITAMIN C  Take 500 mg by mouth 2 (two) times daily.     aspirin 81 mg Cap  Take 81 mg by mouth once daily.     colestipoL 1 gram Tab  Commonly known as: COLESTID  Take 1 g by mouth.     gabapentin 300 MG capsule  Commonly known as: NEURONTIN  Take 300 mg by mouth every evening.     insulin lispro 100 unit/mL pen  Inject 0-15 Units into the skin.     LANTUS SOLOSTAR U-100 INSULIN SUBQ  Inject 35 Units into the skin every evening.     levothyroxine 150 MCG tablet  Commonly known as: SYNTHROID  Take 150 mcg by mouth before breakfast.     LIDOcaine 4 % Ptmd  Apply 1 patch topically.     metFORMIN 500 MG tablet  Commonly known as: GLUCOPHAGE  Take 1,000 mg by mouth 2 (two) times daily with meals.     ondansetron 4 MG Tbdl  Commonly  known as: ZOFRAN-ODT  Take 4 mg by mouth every 8 (eight) hours as needed.     warfarin 3 MG tablet  Commonly known as: COUMADIN  Take 3 mg by mouth Daily.     zinc sulfate 50 mg zinc (220 mg) capsule  Commonly known as: ZINCATE  Take 1 capsule by mouth every morning.              Indwelling Lines/Drains at time of discharge:   Lines/Drains/Airways       None                   Time spent on the discharge of patient: 40 minutes         Eleno Layne MD  Department of Hospital Medicine  [facility] - Telemetry ([facility])

## 2024-01-17 NOTE — PT/OT/SLP PROGRESS
Physical Therapy  Treatment    Chavez Kohler   MRN: [id number]   Admitting Diagnosis: Acute on chronic systolic heart failure    PT Received On: 01/17/24  PT Start Time: 0900     PT Stop Time: 0925    PT Total Time (min): 25 min       Billable Minutes:  Gait Training 10 and Therapeutic Activity 15    Treatment Type: Treatment  PT/PTA: PTA     Number of PTA visits since last PT visit: 2       General Precautions: Standard, droplet, fall  Orthopedic Precautions: N/A  Braces: N/A  Respiratory Status: Nasal cannula, flow 2 L/min    Spiritual, Cultural Beliefs, Rastafarian Practices, Values that Affect Care: no    Subjective:  Communicated with patient's nurse, Helen, and completed Epic chart review prior to session.  Patient agreed to PT session.     Pain/Comfort  Pain Rating 1: 0/10  Pain Rating Post-Intervention 1: 0/10    Objective:   Patient found with: peripheral IV, telemetry, bed alarm, oxygen    Supine > sit EOB: Modified Independent    Forward scoot towards EOB: Modified Independent    STS from EOB > RW: CGA    8ft, 100ft x2 trials w/ RW CGA    Stand pivot T/F to toilet w/ RW: CGA    STS from toilet > RW: CGA    Stand pivot T/F to chair w/ RW: CGA    Completed x15 reps AROM TE to BLE: LAQ, Hip Flex, AP   Intermittent cues given as needed to maintain correct form during repetitions    Educated patient on importance of increased tolerance to upright position and direct impact on CV endurance and strength. Patient encouraged to sit up in chair/ EOB, for a minimum of 2 consecutive hours, 3x per day. Encouraged patient to perform AROM TE to BLE throughout the day within all available planes of motion. Re enforced importance of utilizing call light to meet needs in room and not attempt to get up without staff assistance. Patient verbalized understanding and agreed to comply.       AM-PAC 6 CLICK MOBILITY  How much help from another person does this patient currently need?   1 = Unable, Total/Dependent Assistance  2 =  A lot, Maximum/Moderate Assistance  3 = A little, Minimum/Contact Guard/Supervision  4 = None, Modified [place]/Independent    Turning over in bed (including adjusting bedclothes, sheets and blankets)?: 4  Sitting down on and standing up from a chair with arms (e.g., wheelchair, bedside commode, etc.): 3  Moving from lying on back to sitting on the side of the bed?: 4  Moving to and from a bed to a chair (including a wheelchair)?: 3  Need to walk in hospital room?: 3  Climbing 3-5 steps with a railing?: 1 (NT)  Basic Mobility Total Score: 18    AM-PAC Raw Score CMS G-Code Modifier Level of Impairment Assistance   6 % Total / Unable   7 - 9 CM 80 - 100% Maximal Assist   10 - 14 CL 60 - 80% Moderate Assist   15 - 19 CK 40 - 60% Moderate Assist   20 - 22 CJ 20 - 40% Minimal Assist   23 CI 1-20% SBA / CGA   24 CH 0% Independent/ Mod I     Patient left up in chair with call button in reach and chair alarm on.    Assessment:  Chavez Kohler is a 83 y.o. male with a medical diagnosis of Acute on chronic systolic heart failure and presents with overall decline in functional mobility. Patient would continue to benefit from skilled PT to address functional limitations listed below in order to return to PLOF/decrease caregiver burden.    Rehab identified problem list/impairments: weakness, impaired endurance, impaired self care skills, gait instability, impaired functional mobility, impaired balance, decreased coordination, decreased upper extremity function, decreased lower extremity function, decreased safety awareness, impaired coordination, impaired cardiopulmonary response to activity    Rehab potential is good.    Activity tolerance: Fair    Discharge recommendations: Moderate Intensity Therapy      Barriers to discharge:      Equipment recommendations: to be determined by next level of care     GOALS:   Multidisciplinary Problems       Physical Therapy Goals          Problem: Physical Therapy    Goal  Priority Disciplines Outcome Goal Variances Interventions   Physical Therapy Goal     PT, PT/OT      Description: Goals to be met by 1/29/24.  1. Pt will complete bed mobility SBA.  2. Pt will complete sit to stand SBA.  3. Pt will ambulate 200ft SBA using RW.  4. Pt will increase AMPAC score by 2 points to progress functional mobility.                       PLAN:    Patient to be seen 3 x/week to address the above listed problems via gait training, therapeutic activities, therapeutic exercises  Plan of Care expires: 01/29/24  Plan of Care reviewed with: patient         01/17/2024

## 2024-01-17 NOTE — PLAN OF CARE
[facility] - Telemetry (Hospital)  Initial Discharge Assessment       Primary Care Provider: No, Primary Doctor    Admission Diagnosis: Shortness of breath [R06.02]  Pleural effusion [J90]  Influenza A [J10.1]  Troponin level elevated [R79.89]  Chest pain [R07.9]  Pneumonia of right upper lobe due to infectious organism [J18.9]  Acute congestive heart failure, unspecified heart failure type [I50.9]    Admission Date: 1/14/2024  Expected Discharge Date: 1/17/2024    Transition of Care Barriers: None    Payor: [employer] MEDICARE / Plan: [employer] MEDICARE HMO / Product Type: Capitation /     Extended Emergency Contact Information  Primary Emergency Contact: DORA DUTTON  Mobile Phone: [phone]  Relation: Daughter  Preferred language: English   needed? No    Discharge Plan A: Skilled Nursing Facility  Discharge Plan B: Skilled Nursing Facility    No Pharmacies Listed    Initial Assessment (most recent)       Adult Discharge Assessment - 01/17/24 1739          Discharge Assessment    Assessment Type Discharge Planning Assessment     Source of Information health record     When was your last doctors appointment? 12/06/23   Hem onc appt    Communicated KWASI with patient/caregiver Yes     Reason For Admission Flu     People in Home spouse     Facility Arrived From: [facility]     Do you expect to return to your current living situation? Yes     Do you have help at home or someone to help you manage your care at home? No     Prior to hospitilization cognitive status: Alert/Oriented     Current cognitive status: Alert/Oriented     Walking or Climbing Stairs Difficulty yes     Walking or Climbing Stairs ambulation difficulty, requires equipment     Mobility Management uses cane     Equipment Currently Used at Home cane, straight     Readmission within 30 days? No     Patient currently being followed by outpatient case management? No     Do you currently have service(s) that help you manage your care at home?  No     Do you take prescription medications? Yes     Do you have prescription coverage? Yes     Coverage [employer]     Who is going to help you get home at discharge? facility transport     Are you on dialysis? No     Do you take coumadin? No     Discharge Plan A Skilled Nursing Facility     Discharge Plan B Skilled Nursing Facility     DME Needed Upon Discharge  none     Transition of Care Barriers None                   Patient was transferred from [facility] and will return when medically stable.

## 2024-01-17 NOTE — ASSESSMENT & PLAN NOTE
Patient's FSGs are uncontrolled due to hypoglycemia on current medication regimen.  Last A1c reviewed-   Lab Results   Component Value Date    HGBA1C 11.1 (H) 01/15/2024     Most recent fingerstick glucose reviewed-   Recent Labs   Lab 01/16/24  1219 01/16/24  1707 01/16/24 2052 01/17/24  0609   POCTGLUCOSE 352* 293* 370* 87       Current correctional scale  Low  Decrease anti-hyperglycemic dose as follows-   Antihyperglycemics (From admission, onward)      Start     Stop Route Frequency Ordered    01/16/24 2100  insulin detemir U-100 (Levemir) pen 20 Units         -- SubQ Nightly 01/16/24 1521    01/16/24 1620  insulin aspart U-100 pen 0-10 Units         -- SubQ Before meals & nightly PRN 01/16/24 1521          Hold Oral hypoglycemics while patient is in the hospital.  Hold basal insulin for now as hypoglycemic  Hypoglycemia protocol    Resume home regimen on discharge

## 2024-01-17 NOTE — ASSESSMENT & PLAN NOTE
Being diuresed intolerant to entresto will add ar or ace  Will get records from [facility]    1/17/2024 appears euvolemic will add arb b blockers ready fro discharge.

## 2024-01-17 NOTE — PLAN OF CARE
Spoke to [place] with [facility].  [employer] authorization still pending.

## 2024-01-17 NOTE — PROGRESS NOTES
[facility] the [facility] Telemetry ([facility])  Cardiology  Progress Note    Patient Name: Chavez Kohler  MRN: [id number]  Admission Date: 1/14/2024  Hospital Length of Stay: 1 days  Code Status: Full Code   Attending Physician: Eleno Layne MD   Primary Care Physician: Tess, Primary Doctor  Expected Discharge Date: 1/17/2024  Principal Problem:Acute on chronic systolic heart failure    Subjective:     Hospital Course:   1/17/2024 very comfortable lying flat in bed no distress.no arrythmias .SEKOU I/o documented.    Interval History: denies shortness of breath.    Review of Systems   Constitutional: Positive for malaise/fatigue.   Eyes:  Negative for blurred vision.   Cardiovascular:  Negative for chest pain, claudication, cyanosis, dyspnea on exertion, irregular heartbeat, leg swelling, near-syncope, orthopnea, palpitations and paroxysmal nocturnal dyspnea.   Respiratory:  Negative for cough, hemoptysis and shortness of breath.    Hematologic/Lymphatic: Negative for bleeding problem. Does not bruise/bleed easily.   Skin:  Negative for dry skin and itching.   Musculoskeletal:  Negative for falls, muscle weakness and myalgias.   Gastrointestinal:  Negative for abdominal pain, diarrhea, heartburn, hematemesis, hematochezia and melena.   Genitourinary:  Negative for flank pain and hematuria.   Neurological:  Negative for dizziness, focal weakness, headaches, light-headedness, numbness, paresthesias, seizures and weakness.   Psychiatric/Behavioral:  Negative for altered mental status and memory loss. The patient is not nervous/anxious.    Allergic/Immunologic: Negative for hives.     Objective:     Vital Signs (Most Recent):  Temp: 97.7 °F (36.5 °C) (01/17/24 1119)  Pulse: 70 (01/17/24 1119)  Resp: 18 (01/17/24 1119)  BP: (!) 116/56 (01/17/24 1119)  SpO2: 100 % (01/17/24 1119) Vital Signs (24h Range):  Temp:  [97.5 °F (36.4 °C)-98.9 °F (37.2 °C)] 97.7 °F (36.5 °C)  Pulse:  [57-77] 70  Resp:  [18-20] 18  SpO2:  [92 %-100 %] 100 %  BP:  (111-142)/(56-75) 116/56     Weight: 80.6 kg (177 lb 11.1 oz)  Body mass index is 24.1 kg/m².     SpO2: 100 %         Intake/Output Summary (Last 24 hours) at 1/17/2024 1310  Last data filed at 1/17/2024 0830  Gross per 24 hour   Intake 240 ml   Output --   Net 240 ml       Lines/Drains/Airways       Peripheral Intravenous Line  Duration                  Peripheral IV - Single Lumen 01/14/24 0440 20 G Anterior;Right Forearm 3 days                       Physical Exam  Vitals and nursing note reviewed.   Constitutional:       General: He is not in acute distress.     Appearance: He is well-developed. He is not diaphoretic.   HENT:      Head: Normocephalic and atraumatic.   Eyes:      General:         Right eye: No discharge.         Left eye: No discharge.      Pupils: Pupils are equal, round, and reactive to light.   Neck:      Thyroid: No thyromegaly.      Vascular: No JVD.   Cardiovascular:      Rate and Rhythm: Normal rate and regular rhythm.      Pulses: Normal pulses and intact distal pulses.      Heart sounds: Normal heart sounds. No murmur heard.     No friction rub. No gallop.   Pulmonary:      Effort: Pulmonary effort is normal. No respiratory distress.      Breath sounds: Normal breath sounds. No wheezing or rales.      Comments: Aicd site well healed.  Chest:      Chest wall: No tenderness.   Abdominal:      General: Bowel sounds are normal. There is no distension.      Palpations: Abdomen is soft.      Tenderness: There is no abdominal tenderness.   Musculoskeletal:         General: Normal range of motion.      Cervical back: Neck supple.      Right lower leg: No edema.      Left lower leg: No edema.   Skin:     General: Skin is warm and dry.      Findings: No erythema or rash.   Neurological:      Mental Status: He is alert and oriented to person, place, and time.      Cranial Nerves: No cranial nerve deficit.   Psychiatric:         Behavior: Behavior normal.            Significant Labs:   Recent Lab  Results         01/17/24  0609   01/17/24  0532   01/16/24 2052 01/16/24  1707        Anion Gap   7           BUN   19           Calcium   8.2           Chloride   100           CO2   36           Creatinine   1.2           eGFR   >60           Glucose   112           INR   2.3  Comment: Coumadin Therapy:  2.0 - 3.0 for INR for all indicators except mechanical heart valves  and antiphospholipid syndromes which should use 2.5 - 3.5.             Magnesium    1.5           POCT Glucose 87     370   293       Potassium   3.1           Protime   22.6           Sodium   143                   Significant Imaging: X-Ray: CXR: X-Ray Chest 1 View (CXR):   Results for orders placed or performed during the hospital encounter of 01/14/24   X-Ray Chest 1 View    Narrative    EXAMINATION:  XR CHEST 1 VIEW    CLINICAL HISTORY:  Pneumonia., Shortness of breath;    COMPARISON:  None    FINDINGS:  Sternal wires and mediastinal clips noted.  Left AICD is present.    There is consolidation in the right upper lobe consistent with pneumonia.  There are increased interstitial markings in the right lung base    There are bilateral pleural effusions with atelectasis left lower lobe.      Impression    See above.      Electronically signed by: Royce Villanueva MD  Date:    01/14/2024  Time:    07:06     Assessment and Plan:     Brief HPI: an 84 yo patient with aicd CArdiomyopathy had influenza had ch exacerbation responded well to diuretics meds adjusted he is ready fro discharge. He will follow with DR COHEN [facility]    * Acute on chronic systolic heart failure  Being diuresed intolerant to entresto will add ar or ace  Will get records from [facility]    1/17/2024 appears euvolemic will add arb b blockers ready fro discharge.    AICD (automatic cardioverter/defibrillator) present  No shocks continue pacer clinic care.    Anticoagulant long-term use  Continue coumadin    Type 2 diabetes mellitus with hypoglycemia, with long-term current use of  insulin  Per primary team    Influenza A  Per hospital team.    Atrial fibrillation  On coumadin and  amio in sinus rhythm will b blockers.        VTE Risk Mitigation (From admission, onward)           Ordered     warfarin tablet 3 mg  Daily         01/17/24 1057     warfarin (COUMADIN) tablet 2.5 mg  Once         01/17/24 1058                    Jonelle Tinajero MD  Cardiology  [facility] - Tele[facility] ([facility])

## 2024-01-17 NOTE — SUBJECTIVE & OBJECTIVE
Interval History: denies shortness of breath.    Review of Systems   Constitutional: Positive for malaise/fatigue.   Eyes:  Negative for blurred vision.   Cardiovascular:  Negative for chest pain, claudication, cyanosis, dyspnea on exertion, irregular heartbeat, leg swelling, near-syncope, orthopnea, palpitations and paroxysmal nocturnal dyspnea.   Respiratory:  Negative for cough, hemoptysis and shortness of breath.    Hematologic/Lymphatic: Negative for bleeding problem. Does not bruise/bleed easily.   Skin:  Negative for dry skin and itching.   Musculoskeletal:  Negative for falls, muscle weakness and myalgias.   Gastrointestinal:  Negative for abdominal pain, diarrhea, heartburn, hematemesis, hematochezia and melena.   Genitourinary:  Negative for flank pain and hematuria.   Neurological:  Negative for dizziness, focal weakness, headaches, light-headedness, numbness, paresthesias, seizures and weakness.   Psychiatric/Behavioral:  Negative for altered mental status and memory loss. The patient is not nervous/anxious.    Allergic/Immunologic: Negative for hives.     Objective:     Vital Signs (Most Recent):  Temp: 97.7 °F (36.5 °C) (01/17/24 1119)  Pulse: 70 (01/17/24 1119)  Resp: 18 (01/17/24 1119)  BP: (!) 116/56 (01/17/24 1119)  SpO2: 100 % (01/17/24 1119) Vital Signs (24h Range):  Temp:  [97.5 °F (36.4 °C)-98.9 °F (37.2 °C)] 97.7 °F (36.5 °C)  Pulse:  [57-77] 70  Resp:  [18-20] 18  SpO2:  [92 %-100 %] 100 %  BP: (111-142)/(56-75) 116/56     Weight: 80.6 kg (177 lb 11.1 oz)  Body mass index is 24.1 kg/m².     SpO2: 100 %         Intake/Output Summary (Last 24 hours) at 1/17/2024 1310  Last data filed at 1/17/2024 0830  Gross per 24 hour   Intake 240 ml   Output --   Net 240 ml       Lines/Drains/Airways       Peripheral Intravenous Line  Duration                  Peripheral IV - Single Lumen 01/14/24 0440 20 G Anterior;Right Forearm 3 days                       Physical Exam  Vitals and nursing note reviewed.    Constitutional:       General: He is not in acute distress.     Appearance: He is well-developed. He is not diaphoretic.   HENT:      Head: Normocephalic and atraumatic.   Eyes:      General:         Right eye: No discharge.         Left eye: No discharge.      Pupils: Pupils are equal, round, and reactive to light.   Neck:      Thyroid: No thyromegaly.      Vascular: No JVD.   Cardiovascular:      Rate and Rhythm: Normal rate and regular rhythm.      Pulses: Normal pulses and intact distal pulses.      Heart sounds: Normal heart sounds. No murmur heard.     No friction rub. No gallop.   Pulmonary:      Effort: Pulmonary effort is normal. No respiratory distress.      Breath sounds: Normal breath sounds. No wheezing or rales.      Comments: Aicd site well healed.  Chest:      Chest wall: No tenderness.   Abdominal:      General: Bowel sounds are normal. There is no distension.      Palpations: Abdomen is soft.      Tenderness: There is no abdominal tenderness.   Musculoskeletal:         General: Normal range of motion.      Cervical back: Neck supple.      Right lower leg: No edema.      Left lower leg: No edema.   Skin:     General: Skin is warm and dry.      Findings: No erythema or rash.   Neurological:      Mental Status: He is alert and oriented to person, place, and time.      Cranial Nerves: No cranial nerve deficit.   Psychiatric:         Behavior: Behavior normal.            Significant Labs:   Recent Lab Results         01/17/24  0609   01/17/24  0532   01/16/24 2052 01/16/24  1707        Anion Gap   7           BUN   19           Calcium   8.2           Chloride   100           CO2   36           Creatinine   1.2           eGFR   >60           Glucose   112           INR   2.3  Comment: Coumadin Therapy:  2.0 - 3.0 for INR for all indicators except mechanical heart valves  and antiphospholipid syndromes which should use 2.5 - 3.5.             Magnesium    1.5           POCT Glucose 87     159 345        Potassium   3.1           Protime   22.6           Sodium   143                   Significant Imaging: X-Ray: CXR: X-Ray Chest 1 View (CXR):   Results for orders placed or performed during the hospital encounter of 01/14/24   X-Ray Chest 1 View    Narrative    EXAMINATION:  XR CHEST 1 VIEW    CLINICAL HISTORY:  Pneumonia., Shortness of breath;    COMPARISON:  None    FINDINGS:  Sternal wires and mediastinal clips noted.  Left AICD is present.    There is consolidation in the right upper lobe consistent with pneumonia.  There are increased interstitial markings in the right lung base    There are bilateral pleural effusions with atelectasis left lower lobe.      Impression    See above.      Electronically signed by: Royce Villanueva MD  Date:    01/14/2024  Time:    07:06

## 2024-01-17 NOTE — PLAN OF CARE
A209/A209 MICHEAL Kohler is a 83 y.o.male admitted on 1/14/2024 for Acute on chronic systolic heart failure   Code Status: Full Code MRN: [id number]   Review of patient's allergies indicates:   Allergen Reactions    Morphine Shortness Of Breath    Amoxicillin Nausea And Vomiting     Past Medical History:   Diagnosis Date    Acute kidney failure, unspecified     AICD (automatic cardioverter/defibrillator) present 01/16/2024    Chronic systolic heart failure     Current use of long term anticoagulation     Longstanding persistent atrial fibrillation     Metabolic encephalopathy     Mixed hyperlipidemia     Presence of aortocoronary bypass graft     Presence of cardiac pacemaker     Primary hypertension     Stage 3b chronic kidney disease (CKD)     Type 2 diabetes mellitus without complications       PRN meds    acetaminophen, 650 mg, Q4H PRN  albuterol-ipratropium, 3 mL, Q4H PRN  aluminum-magnesium hydroxide-simethicone, 30 mL, QID PRN  dextrose 10%, 12.5 g, PRN  dextrose 10%, 12.5 g, PRN  dextrose 10%, 25 g, PRN  dextrose 10%, 25 g, PRN  glucagon (human recombinant), 1 mg, PRN  glucose, 16 g, PRN  glucose, 24 g, PRN  guaiFENesin 100 mg/5 ml, 200 mg, Q4H PRN  insulin aspart U-100, 0-10 Units, QID (AC + HS) PRN  naloxone, 0.4 mg, PRN  ondansetron, 4 mg, Q8H PRN  simethicone, 1 tablet, QID PRN  sodium chloride 0.9%, 10 mL, PRN      Chart check completed. Will continue plan of care.      Orientation: oriented x 4  [place] Coma Scale Score: 15     Lead Monitored: Lead II Rhythm: normal sinus rhythm    Cardiac/Telemetry Box Number: [id number]  VTE Required Core Measure: Pharmacological prophylaxis initiated/maintained Last Bowel Movement: 01/15/24  Diet Low Sodium, 2gm Fluid - 1500mL     Nishant Score: 18  Fall Risk Score: 16  Accucheck []   Freq?      Lines/Drains/Airways       Peripheral Intravenous Line  Duration                  Peripheral IV - Single Lumen 01/14/24 0440 20 G Anterior;Right Forearm 3 days                        Problem: Adult Inpatient Plan of Care  Goal: Plan of Care Review  Outcome: Met  Goal: Patient-Specific Goal (Individualized)  Outcome: Met  Goal: Absence of Hospital-Acquired Illness or Injury  Outcome: Met  Goal: Optimal Comfort and Wellbeing  Outcome: Met  Goal: Readiness for Transition of Care  Outcome: Met     Problem: Diabetes Comorbidity  Goal: Blood Glucose Level Within Targeted Range  Outcome: Met     Problem: Fall Injury Risk  Goal: Absence of Fall and Fall-Related Injury  Outcome: Met     Problem: Skin Injury Risk Increased  Goal: Skin Health and Integrity  Outcome: Met     Problem: Impaired Wound Healing  Goal: Optimal Wound Healing  Outcome: Met     Problem: Infection  Goal: Absence of Infection Signs and Symptoms  Outcome: Met

## 2024-01-18 VITALS
OXYGEN SATURATION: 98 % | SYSTOLIC BLOOD PRESSURE: 122 MMHG | RESPIRATION RATE: 18 BRPM | TEMPERATURE: 98 F | DIASTOLIC BLOOD PRESSURE: 68 MMHG | WEIGHT: 177.69 LBS | BODY MASS INDEX: 24.07 KG/M2 | HEIGHT: 72 IN | HEART RATE: 60 BPM

## 2024-01-18 LAB
ANION GAP SERPL CALC-SCNC: 12 MMOL/L (ref 8–16)
BUN SERPL-MCNC: 26 MG/DL (ref 8–23)
CALCIUM SERPL-MCNC: 8.1 MG/DL (ref 8.7–10.5)
CHLORIDE SERPL-SCNC: 102 MMOL/L (ref 95–110)
CO2 SERPL-SCNC: 29 MMOL/L (ref 23–29)
CREAT SERPL-MCNC: 1.3 MG/DL (ref 0.5–1.4)
EST. GFR  (NO RACE VARIABLE): 55 ML/MIN/1.73 M^2
GLUCOSE SERPL-MCNC: 134 MG/DL (ref 70–110)
INR PPP: 2.4 (ref 0.8–1.2)
MAGNESIUM SERPL-MCNC: 1.8 MG/DL (ref 1.6–2.6)
POCT GLUCOSE: 111 MG/DL (ref 70–110)
POTASSIUM SERPL-SCNC: 3.7 MMOL/L (ref 3.5–5.1)
PROTHROMBIN TIME: 24.8 SEC (ref 9–12.5)
SODIUM SERPL-SCNC: 143 MMOL/L (ref 136–145)

## 2024-01-18 PROCEDURE — 85610 PROTHROMBIN TIME: CPT | Performed by: HOSPITALIST

## 2024-01-18 PROCEDURE — 25000003 PHARM REV CODE 250: Performed by: HOSPITALIST

## 2024-01-18 PROCEDURE — 25000003 PHARM REV CODE 250: Performed by: INTERNAL MEDICINE

## 2024-01-18 PROCEDURE — 63600175 PHARM REV CODE 636 W HCPCS: Performed by: HOSPITALIST

## 2024-01-18 PROCEDURE — 83735 ASSAY OF MAGNESIUM: CPT | Performed by: HOSPITALIST

## 2024-01-18 PROCEDURE — 36415 COLL VENOUS BLD VENIPUNCTURE: CPT | Performed by: HOSPITALIST

## 2024-01-18 PROCEDURE — 80048 BASIC METABOLIC PNL TOTAL CA: CPT | Performed by: HOSPITALIST

## 2024-01-18 RX ADMIN — LOSARTAN POTASSIUM 25 MG: 25 TABLET, FILM COATED ORAL at 09:01

## 2024-01-18 RX ADMIN — OSELTAMIVIR PHOSPHATE 30 MG: 30 CAPSULE ORAL at 09:01

## 2024-01-18 RX ADMIN — LEVOTHYROXINE SODIUM 150 MCG: 150 TABLET ORAL at 06:01

## 2024-01-18 RX ADMIN — BUMETANIDE 1 MG: 0.25 INJECTION INTRAMUSCULAR; INTRAVENOUS at 11:01

## 2024-01-18 RX ADMIN — METOPROLOL SUCCINATE 25 MG: 25 TABLET, EXTENDED RELEASE ORAL at 09:01

## 2024-01-18 RX ADMIN — DOXYCYCLINE HYCLATE 100 MG: 100 TABLET, COATED ORAL at 09:01

## 2024-01-18 RX ADMIN — AMIODARONE HYDROCHLORIDE 200 MG: 200 TABLET ORAL at 09:01

## 2024-01-18 RX ADMIN — POTASSIUM CHLORIDE 40 MEQ: 1500 TABLET, EXTENDED RELEASE ORAL at 09:01

## 2024-01-18 NOTE — PLAN OF CARE
[facility] - Telemetry (Hospital)  Discharge Final Note    Primary Care Provider: No, Primary Doctor    Expected Discharge Date: 1/17/2024    Final Discharge Note (most recent)       Final Note - 01/18/24 1245          Final Note    Assessment Type Final Discharge Note (P)      Anticipated Discharge Disposition Skilled Nursing Facility (P)         Post-Acute Status    Post-Acute Authorization Placement (P)      Post-Acute Placement Status Set-up Complete/Auth obtained (P)    return to [facility]    Discharge Delays None known at this time (P)                      Important Message from Medicare             Contact Info       Min Diego MD   Specialty: Vascular Surgery    [address] [place] [address]   Phone: [phone]       Next Steps: Schedule an appointment as soon as possible for a visit in 1 week(s)    Instructions: Hospital discharge follow up

## 2024-01-18 NOTE — PT/OT/SLP PROGRESS
Physical Therapy      Patient Name:  Chavez Kohler   MRN:  [id number]    Chart review complete. Presented to pt's room at 1406. Patient not seen today secondary to pt d/c prior to tx. No need to follow-up.    Rosemary Padilla, PT, DPT  1/18/2024   1406

## 2024-01-18 NOTE — CONSULTS
[facility] ([facility])  Wound Care    Patient Name:  Chavez Kohler   MRN:  [id number]  Date: 1/18/2024  Diagnosis: Acute on chronic systolic heart failure    History:     Past Medical History:   Diagnosis Date    Acute kidney failure, unspecified     AICD (automatic cardioverter/defibrillator) present 01/16/2024    Chronic systolic heart failure     Current use of long term anticoagulation     Longstanding persistent atrial fibrillation     Metabolic encephalopathy     Mixed hyperlipidemia     Presence of aortocoronary bypass graft     Presence of cardiac pacemaker     Primary hypertension     Stage 3b chronic kidney disease (CKD)     Type 2 diabetes mellitus without complications        Social History     Socioeconomic History    Marital status: Unknown   Tobacco Use    Smoking status: Former     Types: Cigarettes    Smokeless tobacco: Never   Substance and Sexual Activity    Alcohol use: Not Currently       Precautions:     Allergies as of 01/14/2024 - Reviewed 01/14/2024   Allergen Reaction Noted    Morphine Shortness Of Breath 07/10/2022    Amoxicillin Nausea And Vomiting 01/02/2024       WO Assessment Details/Treatment     Consulted on this 82 y/o M patient due to altered skin integrity. Patient is awake and alert, denies pain. Reports ongoing issue prior to admit.   Partial thickness tissue loss with surrounding blanchable redness noted in linear shape within distal gluteal cleft/tarun-rectal skin, skin denuded. Cleansed with bath wipes and moisture barrier paste applied. Discussed ongoing care with patient - avoid diapers, apply paste BID and prn after cleansing.       01/18/24 0954   WOCN Assessment   WOCN Total Time (mins) 30   Visit Date 01/18/24   Visit Time 0954   Consult Type New   WOCN Speciality Wound   Wound moisture   Number of Wounds 1   Intervention assessed;applied;chart review;coordination of care;team conference;orders   Teaching on-going;discharge;complication        Altered Skin  Integrity 01/14/24 1723 medial Perirectal #1 Moisture associated dermatitis   Date First Assessed/Time First Assessed: 01/14/24 1723   Altered Skin Integrity Present on Admission - Did Patient arrive to the hospital with altered skin?: yes  Orientation: medial  Location: Perirectal  Wound Number: #1  Is this injury device relat...   Wound Image    Description of Altered Skin Integrity   (not pressure related skin injury)   Dressing Appearance Open to air   Drainage Amount Scant   Drainage Characteristics/Odor Serous   Appearance Pink;Red;Moist   Tissue loss description Partial thickness   Wound Edges Open   Wound Length (cm) 3 cm   Wound Width (cm) 0.5 cm   Wound Depth (cm) 0.1 cm   Wound Volume (cm^3) 0.15 cm^3   Wound Surface Area (cm^2) 1.5 cm^2   Care Cleansed with:;Sterile normal saline;Applied:;Skin Barrier  (z guard)       Recommendations made to primary team for moisture management . Orders placed.     01/18/2024

## 2024-01-18 NOTE — PLAN OF CARE
Received authorization to return to [facility].  Discharge paperwork sent via [employer].    Number for report given to nurse.  Transportation is on the way.       01/18/24 1244   Post-Acute Status   Post-Acute Authorization Placement   Post-Acute Placement Status Set-up Complete/Auth obtained   Discharge Plan   Discharge Plan A Skilled Nursing Facility

## 2024-01-18 NOTE — PROGRESS NOTES
Clinical Pharmacy Progress Note: Coumadin Dosing and Monitoring      Indication: Afib  Goal INR: 2-3  INR today = 2.4  Drug interactions:  amiodarone, acetaminophen, levothyroxine and doxycycline each may increase the risk of bleeding while on warfarin   Continue 3 mg po daily today      Estimated Creatinine Clearance: 48.1 mL/min (based on SCr of 1.3 mg/dL).   Body mass index is 24.1 kg/m².   Lab Results   Component Value Date    WBC 5.06 01/14/2024    HGB 8.5 (L) 01/14/2024    HCT 28.4 (L) 01/14/2024    MCV 87 01/14/2024     01/14/2024               Becki Boyd [facility] 1/18/2024 11:30 AM

## 2024-01-19 ENCOUNTER — TELEPHONE (OUTPATIENT)
Dept: CARDIOLOGY | Facility: CLINIC | Age: 84
End: 2024-01-19
Payer: MEDICARE

## 2024-01-19 NOTE — TELEPHONE ENCOUNTER
[facility]) hospital discharge 48-72 hour phone follow up completed.     Most Recent Hospital Discharge Date: 1/17/24   Last admission Diagnosis/chief complaint:acute/chronic HF    TCC nurse Navigator spoke with pts nurse at [facility]        Pt reports the following:  []  Shortness of Breath with Activity  []  Shortness of Breath at rest   []  Fatigue  []  Edema   [] Chest pain or tightness  [] Weight Increase since discharge  [x] None of the above    Medications:   Discharge medication reviewed with pt.  Pt reports having medication list available and has all medications at home for use per list.     Education:   Reviewed key points as listed below.     Recommend 2 -3 gram sodium restriction and 1500 cc-2000 cc fluid restriction.  Encourage physical activity with graded exercise program.  Requested patient to weigh themselves daily, and to notify us if their weight increases by more than 3 lbs in 1 day or 5 lbs in 3 days.       Watch for these Signs and Symptoms: If any of these occur, contact H[facility] immediately:   Increase in shortness of breath with movement   Increase in swelling in your legs and ankles   Weight gain of more than 3 pounds in a day or 5 pounds in 3 days.   Difficulty breathing when you are lying down   Worsening fatigue or tiredness   Stomach bloating, a full feeling or a loss of appetite   Increased coughing--especially when you are lying down      Pt was able to verbalize back to nurse in their own words correct diet/fluid restrictions, necessity for exercise, warning signs and symptoms, when and how to contact their TCC team.      Pt educated on follow-up plan while in HFT[facility] program to include:   Week 1 -  F/u appt with Provider and HF nurse (Date) 1/26 with Opal COELLO   Week 2-5 - In person/ Virtual/ phone call check ins    Week 5-7 - Pt will discharge from [facility] and transition to longterm care provider (Cardiology/PCP/ Advanced Heart  Failure).      Patient active on myChart? No      Pt given the following contact information for ease of communication: [phone] (Mon-Fri, 8a-5p) & for urgent issues on the weekend call [facility] on-call [phone] to page the Cardiology MD on call.  Pt also encouraged utilize [facility] messaging as well.      Will follow up with pt at first clinic visit and HF nurse navigator available for pt questions, issues or concerns.